# Patient Record
Sex: FEMALE | Race: WHITE | NOT HISPANIC OR LATINO | Employment: FULL TIME | URBAN - METROPOLITAN AREA
[De-identification: names, ages, dates, MRNs, and addresses within clinical notes are randomized per-mention and may not be internally consistent; named-entity substitution may affect disease eponyms.]

---

## 2023-04-12 ENCOUNTER — APPOINTMENT (OUTPATIENT)
Dept: LAB | Facility: LAB | Age: 33
End: 2023-04-12
Payer: COMMERCIAL

## 2023-04-12 LAB
ABO GROUP (TYPE) IN BLOOD: NORMAL
ANTIBODY SCREEN: NORMAL
ERYTHROCYTE DISTRIBUTION WIDTH (RATIO) BY AUTOMATED COUNT: 12.5 % (ref 11.5–14.5)
ERYTHROCYTE MEAN CORPUSCULAR HEMOGLOBIN CONCENTRATION (G/DL) BY AUTOMATED: 34.8 G/DL (ref 32–36)
ERYTHROCYTE MEAN CORPUSCULAR VOLUME (FL) BY AUTOMATED COUNT: 86 FL (ref 80–100)
ERYTHROCYTES (10*6/UL) IN BLOOD BY AUTOMATED COUNT: 4.06 X10E12/L (ref 4–5.2)
GLUCOSE, 1 HR SCREEN, PREG: 107 MG/DL
HEMATOCRIT (%) IN BLOOD BY AUTOMATED COUNT: 35.1 % (ref 36–46)
HEMOGLOBIN (G/DL) IN BLOOD: 12.2 G/DL (ref 12–16)
LEUKOCYTES (10*3/UL) IN BLOOD BY AUTOMATED COUNT: 9.9 X10E9/L (ref 4.4–11.3)
NRBC (PER 100 WBCS) BY AUTOMATED COUNT: 0 /100 WBC (ref 0–0)
PLATELETS (10*3/UL) IN BLOOD AUTOMATED COUNT: 230 X10E9/L (ref 150–450)
REFLEX ADDED, ANEMIA PANEL: ABNORMAL
RH FACTOR: NORMAL
SYPHILIS TOTAL AB: NONREACTIVE

## 2023-05-03 ENCOUNTER — HOSPITAL ENCOUNTER (OUTPATIENT)
Dept: DATA CONVERSION | Facility: HOSPITAL | Age: 33
End: 2023-05-03
Attending: STUDENT IN AN ORGANIZED HEALTH CARE EDUCATION/TRAINING PROGRAM
Payer: COMMERCIAL

## 2023-05-03 DIAGNOSIS — O99.353 DISEASES OF THE NERVOUS SYSTEM COMPLICATING PREGNANCY, THIRD TRIMESTER (HHS-HCC): ICD-10-CM

## 2023-05-03 DIAGNOSIS — R03.0 ELEVATED BLOOD-PRESSURE READING, WITHOUT DIAGNOSIS OF HYPERTENSION: ICD-10-CM

## 2023-05-03 DIAGNOSIS — G43.909 MIGRAINE, UNSPECIFIED, NOT INTRACTABLE, WITHOUT STATUS MIGRAINOSUS: ICD-10-CM

## 2023-05-03 DIAGNOSIS — F32.A DEPRESSION, UNSPECIFIED: ICD-10-CM

## 2023-05-03 DIAGNOSIS — O26.893 OTHER SPECIFIED PREGNANCY RELATED CONDITIONS, THIRD TRIMESTER (HHS-HCC): ICD-10-CM

## 2023-05-03 DIAGNOSIS — O99.343 OTHER MENTAL DISORDERS COMPLICATING PREGNANCY, THIRD TRIMESTER (HHS-HCC): ICD-10-CM

## 2023-05-03 DIAGNOSIS — Z79.899 OTHER LONG TERM (CURRENT) DRUG THERAPY: ICD-10-CM

## 2023-05-03 DIAGNOSIS — O13.3 GESTATIONAL (PREGNANCY-INDUCED) HYPERTENSION WITHOUT SIGNIFICANT PROTEINURIA, THIRD TRIMESTER (HHS-HCC): ICD-10-CM

## 2023-05-03 DIAGNOSIS — Z3A.29 29 WEEKS GESTATION OF PREGNANCY (HHS-HCC): ICD-10-CM

## 2023-05-03 DIAGNOSIS — F41.9 ANXIETY DISORDER, UNSPECIFIED: ICD-10-CM

## 2023-05-03 LAB
ALANINE AMINOTRANSFERASE (SGPT) (U/L) IN SER/PLAS: 12 U/L (ref 7–45)
ALBUMIN (G/DL) IN SER/PLAS: 3.4 G/DL (ref 3.4–5)
ALKALINE PHOSPHATASE (U/L) IN SER/PLAS: 116 U/L (ref 33–110)
ANION GAP IN SER/PLAS: 15 MMOL/L (ref 10–20)
ASPARTATE AMINOTRANSFERASE (SGOT) (U/L) IN SER/PLAS: 17 U/L (ref 9–39)
BILIRUBIN TOTAL (MG/DL) IN SER/PLAS: 0.4 MG/DL (ref 0–1.2)
CALCIUM (MG/DL) IN SER/PLAS: 9.1 MG/DL (ref 8.6–10.6)
CARBON DIOXIDE, TOTAL (MMOL/L) IN SER/PLAS: 22 MMOL/L (ref 21–32)
CHLORIDE (MMOL/L) IN SER/PLAS: 98 MMOL/L (ref 98–107)
CREATININE (MG/DL) IN SER/PLAS: 0.64 MG/DL (ref 0.5–1.05)
CREATININE (MG/DL) IN URINE: 52.6 MG/DL (ref 20–320)
ERYTHROCYTE DISTRIBUTION WIDTH (RATIO) BY AUTOMATED COUNT: 12.6 % (ref 11.5–14.5)
ERYTHROCYTE MEAN CORPUSCULAR HEMOGLOBIN CONCENTRATION (G/DL) BY AUTOMATED: 35.5 G/DL (ref 32–36)
ERYTHROCYTE MEAN CORPUSCULAR VOLUME (FL) BY AUTOMATED COUNT: 84 FL (ref 80–100)
ERYTHROCYTES (10*6/UL) IN BLOOD BY AUTOMATED COUNT: 4.18 X10E12/L (ref 4–5.2)
GFR FEMALE: >90 ML/MIN/1.73M2
GLUCOSE (MG/DL) IN SER/PLAS: 58 MG/DL (ref 74–99)
HEMATOCRIT (%) IN BLOOD BY AUTOMATED COUNT: 35.2 % (ref 36–46)
HEMOGLOBIN (G/DL) IN BLOOD: 12.5 G/DL (ref 12–16)
LACTATE DEHYDROGENASE (U/L) IN SER/PLAS BY LAC->PYR RXN: 172 U/L (ref 84–246)
LEUKOCYTES (10*3/UL) IN BLOOD BY AUTOMATED COUNT: 10.6 X10E9/L (ref 4.4–11.3)
NRBC (PER 100 WBCS) BY AUTOMATED COUNT: 0 /100 WBC (ref 0–0)
PLATELETS (10*3/UL) IN BLOOD AUTOMATED COUNT: 186 X10E9/L (ref 150–450)
POTASSIUM (MMOL/L) IN SER/PLAS: 3.9 MMOL/L (ref 3.5–5.3)
PROTEIN (MG/DL) IN URINE: 6 MG/DL (ref 5–24)
PROTEIN TOTAL: 6.3 G/DL (ref 6.4–8.2)
PROTEIN/CREATININE (MG/MG) IN URINE: 0.11 MG/MG CREAT (ref 0–0.17)
SODIUM (MMOL/L) IN SER/PLAS: 131 MMOL/L (ref 136–145)
URATE (MG/DL) IN SER/PLAS: 5.3 MG/DL (ref 2.3–6.7)
UREA NITROGEN (MG/DL) IN SER/PLAS: 10 MG/DL (ref 6–23)

## 2023-05-05 ENCOUNTER — HOSPITAL ENCOUNTER (OUTPATIENT)
Dept: DATA CONVERSION | Facility: HOSPITAL | Age: 33
End: 2023-05-05
Attending: OBSTETRICS & GYNECOLOGY
Payer: COMMERCIAL

## 2023-05-05 DIAGNOSIS — F32.A DEPRESSION, UNSPECIFIED: ICD-10-CM

## 2023-05-05 DIAGNOSIS — F41.9 ANXIETY DISORDER, UNSPECIFIED: ICD-10-CM

## 2023-05-05 DIAGNOSIS — O13.3 GESTATIONAL (PREGNANCY-INDUCED) HYPERTENSION WITHOUT SIGNIFICANT PROTEINURIA, THIRD TRIMESTER (HHS-HCC): ICD-10-CM

## 2023-05-05 DIAGNOSIS — O26.893 OTHER SPECIFIED PREGNANCY RELATED CONDITIONS, THIRD TRIMESTER (HHS-HCC): ICD-10-CM

## 2023-05-05 DIAGNOSIS — O99.343 OTHER MENTAL DISORDERS COMPLICATING PREGNANCY, THIRD TRIMESTER (HHS-HCC): ICD-10-CM

## 2023-05-05 DIAGNOSIS — R10.11 RIGHT UPPER QUADRANT PAIN: ICD-10-CM

## 2023-05-05 DIAGNOSIS — R03.0 ELEVATED BLOOD-PRESSURE READING, WITHOUT DIAGNOSIS OF HYPERTENSION: ICD-10-CM

## 2023-05-05 DIAGNOSIS — Z3A.30 30 WEEKS GESTATION OF PREGNANCY (HHS-HCC): ICD-10-CM

## 2023-05-05 LAB
ALANINE AMINOTRANSFERASE (SGPT) (U/L) IN SER/PLAS: 13 U/L (ref 7–45)
ALBUMIN (G/DL) IN SER/PLAS: 3.5 G/DL (ref 3.4–5)
ALKALINE PHOSPHATASE (U/L) IN SER/PLAS: 120 U/L (ref 33–110)
ANION GAP IN SER/PLAS: 15 MMOL/L (ref 10–20)
ASPARTATE AMINOTRANSFERASE (SGOT) (U/L) IN SER/PLAS: 21 U/L (ref 9–39)
BILIRUBIN TOTAL (MG/DL) IN SER/PLAS: 0.4 MG/DL (ref 0–1.2)
CALCIUM (MG/DL) IN SER/PLAS: 9.1 MG/DL (ref 8.6–10.6)
CARBON DIOXIDE, TOTAL (MMOL/L) IN SER/PLAS: 21 MMOL/L (ref 21–32)
CHLORIDE (MMOL/L) IN SER/PLAS: 99 MMOL/L (ref 98–107)
CREATININE (MG/DL) IN SER/PLAS: 0.65 MG/DL (ref 0.5–1.05)
CREATININE (MG/DL) IN URINE: 43.8 MG/DL (ref 20–320)
ERYTHROCYTE DISTRIBUTION WIDTH (RATIO) BY AUTOMATED COUNT: 12.8 % (ref 11.5–14.5)
ERYTHROCYTE MEAN CORPUSCULAR HEMOGLOBIN CONCENTRATION (G/DL) BY AUTOMATED: 34.3 G/DL (ref 32–36)
ERYTHROCYTE MEAN CORPUSCULAR VOLUME (FL) BY AUTOMATED COUNT: 86 FL (ref 80–100)
ERYTHROCYTES (10*6/UL) IN BLOOD BY AUTOMATED COUNT: 4.27 X10E12/L (ref 4–5.2)
GFR FEMALE: >90 ML/MIN/1.73M2
GLUCOSE (MG/DL) IN SER/PLAS: 50 MG/DL (ref 74–99)
HEMATOCRIT (%) IN BLOOD BY AUTOMATED COUNT: 36.7 % (ref 36–46)
HEMOGLOBIN (G/DL) IN BLOOD: 12.6 G/DL (ref 12–16)
LACTATE DEHYDROGENASE (U/L) IN SER/PLAS BY LAC->PYR RXN: 267 U/L (ref 84–246)
LEUKOCYTES (10*3/UL) IN BLOOD BY AUTOMATED COUNT: 11 X10E9/L (ref 4.4–11.3)
NRBC (PER 100 WBCS) BY AUTOMATED COUNT: 0 /100 WBC (ref 0–0)
PLATELETS (10*3/UL) IN BLOOD AUTOMATED COUNT: 189 X10E9/L (ref 150–450)
POTASSIUM (MMOL/L) IN SER/PLAS: 4.2 MMOL/L (ref 3.5–5.3)
PROTEIN (MG/DL) IN URINE: 5 MG/DL (ref 5–24)
PROTEIN TOTAL: 6.5 G/DL (ref 6.4–8.2)
PROTEIN/CREATININE (MG/MG) IN URINE: 0.11 MG/MG CREAT (ref 0–0.17)
SODIUM (MMOL/L) IN SER/PLAS: 131 MMOL/L (ref 136–145)
UREA NITROGEN (MG/DL) IN SER/PLAS: 10 MG/DL (ref 6–23)

## 2023-09-07 VITALS
WEIGHT: 161.82 LBS | BODY MASS INDEX: 29.78 KG/M2 | HEIGHT: 62 IN | WEIGHT: 161.6 LBS | HEIGHT: 62 IN | BODY MASS INDEX: 29.74 KG/M2

## 2023-09-14 NOTE — PROGRESS NOTES
Current Stage:   Stage: Triage     OB Dating:   EDC/EGA:  ·  EGA 29.5     Subjective Data:   Antepartum:  Vaginal Bleeding: No   Contractions/Abdominal Pain: No   Discharge/Loss of Fluid: No   Fetal Movement: Good   Fevers/Chills: No   Preeclampsia Symptoms: No   Antepartum:    Meaghan is a 33yo G1 at 29.5wga d/b 10.5wk US who presents w/ elevated BPs at home.    HPI: Patient says she g a migraine last friday and has been checking her blood pressure at home since. It has ranged from 135-155 systolic over  diastolic. She denies HA today. She denies vision changes or RUQ pain. Endorsing good FM, no VB, LOF  or ctx.     Pregnancy notable for:  - x1 MRBP in office at 27 wks   - depression, zoloft 50mg  - migraines, previously on cymbalta, stopped taking in pregnancy  - Rh neg, s/p rhogam on 4/20     OBHx: G1  GynHx: no STIs,   PMH: migraines   PSH: ACL repair at 17yo    All: nkda   Meds: PNV, zoloft   Social: denies etoh, drugs, tobacco  FamHx:         Objective Information:    Objective Information:      T   P  R  BP   MAP  SpO2   Value     74  16  140/93   112  98%  Date/Time   5/3 17:42 5/3 16:07 5/3 17:37  5/3 17:37 5/3 17:42  Range     (67 - 77 )  (16 - 16 )  (136 - 144 )/ (90 - 96 )  (108 - 116 )  (97% - 99% )      Pain reported at 5/3 16:05: 0 = None      Physical Exam:   Constitutional: alert, oriented   Obstetric: FHT: 135, mod variability, +accels, -decels   TOCO: quiet   Respiratory/Thorax: normal respiratory effort, on  room air   Gastrointestinal: +BS, +flatus   Musculoskeletal: FOURNIER   Extremities: no erythema, edema, or tenderness to  palpation of b/l calves   Neurological: no deficits   Psychological: appropriate affect   Skin: no rashes or lesions     Recent Lab Results:    Results:    CBC: 5/3/2023 16:59              \     Hgb     /                              \     12.5       /  WBC  ----------------  Plt               10.6       ----------------    186              /     Hct     \                               /     35.2 L    \            RBC: 4.18     MCV: 84          Testing:   NST Interpretation - Baby A:  ·  Baseline    ·  Variability moderate (amplitude range 6 to 25 bpm)   ·  Interpretation Appropriate for EGA (2 10x10 accels)   ·  Accelerations +   ·  Decelerations -     Assessment and Plan:   Assessment:    Meaghan is a 33yo G1 at 29.5wga d/b 10.5wk US who presents w/ elevated BPs at home.    gHTN  - new diagnosis made today by elevated BPs >4 hrs apart (first BP at 27wks and second here in triage)  - BPs mild range in triage  - HELLP labs neg  - PCR 0.11  - denies HA, N/V, RUQ pain, vision changes  - note made in outpatient chart of dx  - has BP cuff for home, BID BP checks   - discussed s/sx of PEC and return precautions    IUP at 29.5  - NST appropriate for gestational age   - Good fetal movement  - Continue routine prenatal care, next appt:  w/ CNM    Maternal Well-being  - Vital signs stable and WNL  - Emotional support and reassurance provided  - All questions and concerns addressed     D/w Dr. Bryant.  MIC Longo     Plan of Care Reviewed With:  Plan of Care Reviewed With: patient     Attestation:   Note Completion:  I am a:  Advanced Practice Provider   Attending Only - Shared Visit with Advanced Practice Provider This is a shared visit.  I have reviewed the Advanced Practice Provider?s encounter note, approve the Advanced Practice Provider?s documentation,  and provide the following additional information from my personal encounter.    Comments/ Additional Findings    agree          Electronic Signatures:  Lan Bryant)  (Signed 03-May-2023 21:43)   Authored: Note Completion   Co-Signer: Current Stage, OB Dating, Subjective Data, Objective Data,  Testing, Assessment and Plan, Note Completion  Madison Vega (PAC)  (Signed 03-May-2023 18:44)   Authored: Current Stage, OB Dating, Subjective Data,  Objective Data,  Testing,  Assessment and Plan, Note Completion      Last Updated: 03-May-2023 21:43 by Lan Bryant)

## 2023-09-14 NOTE — PROGRESS NOTES
Current Stage:   Stage: Triage     OB Dating:   EDC/EGA:  ·  Final MARK 14-Jul-2023   ·  EGA 30     Subjective Data:   Antepartum:  Vaginal Bleeding: No   Contractions/Abdominal Pain: No   Discharge/Loss of Fluid: No   Fetal Movement: Good   Fevers/Chills: No   Preeclampsia Symptoms: No   Antepartum:    Meaghan is a 31yo G1 at 30.0wks by 10.5wk US who presents to triage from the office for severe range BPs.  She states she had one severe range followed by a mild range BP.  She denies  HA, vision changes, CP, SOB, or RUQ pain.  She denies VB, LOF, ctx, and endorses good fetal movement.  Of note, she was diagnosed with gHTN on 5/3 for mild range BPs >4hrs apart.    Pregnancy notable for:  - gHTN, dx b/o mild range BPs >4hrs apart  - Anxiety/depression, no meds, mood stable  - RH neg, s/p rhogam        Objective Information:    Objective Information:      T   P  R  BP   MAP  SpO2   Value  36.7  77  18  138/90   110  97%  Date/Time 5/5 16:35 5/5 19:36 5/5 16:35 5/5 19:36  5/5 19:36 5/5 16:35  Range  (36.7C - 36.7C )  (65 - 78 )  (18 - 18 )  (136 - 154 )/ (85 - 92 )  (108 - 113 )  (97% - 97% )      Pain reported at 5/5 16:35: per patient, no pain but RUQ pressure- Ponter notified      Physical Exam:   Constitutional: Alert, conversational, well-appearing   Obstetric: FHT: 130, mod variability, +accels, -decels   Violet: q5-7 on TOCO, pt denies ctx   Eyes: Sclera white, EOM intact, no discharge or erythema   ENMT: No hearing deficit, no goiter present   Head/Neck: Normocephalic, atraumatic, full range  of motion intact   Respiratory/Thorax: No increased work of breathing,  no cough present, rate normal   Cardiovascular: No edema present   Gastrointestinal: Gravid   Genitourinary: No suprapubic tenderness   Musculoskeletal: Strength +5 in all extremities bilaterally   Extremities: No calf tenderness, redness or warmth   Neurological: A/O x3, conversational   Breast: No redness or warmth   Psychological: Behavior appropriate,  interactive   Skin: No rashes or lesions     Recent Lab Results:    Results:    CBC: 2023 17:06              \     Hgb     /                              \     12.6       /  WBC  ----------------  Plt               11.0       ----------------    189              /     Hct     \                              /     36.7       \            RBC: 4.27     MCV: 86           CMP: 2023 17:06  NA+        Cl-     BUN  /                         131 L   99    10  /  --------------------------------  Glucose                ---------------------------  50 LL    K+     HCO3-   Creat \                         4.2    21    0.65  \           \  T Bili  /                    \  0.4  /  AST  x ---- x ALT        21 x ---- x 13         /  Alk P   \               /  120 H \  Calcium : 9.1     Anion Gap : 15     Albumin : 3.5     T Protein : 6.5            Testing:   NST Interpretation - Baby A:  ·  Baseline    ·  Variability moderate (amplitude range 6 to 25 bpm)   ·  Interpretation Appropriate for EGA (2 10x10 accels)   ·  Accelerations Present   ·  Decelerations Absent     Assessment and Plan:        Additional Dx:   30 weeks gestation of pregnancy: Entered Date: 05-May-2023  19:52       Surg History:   NST (non-stress test) reactive: Entered Date: 03-May-2023  18:44   Gestational hypertension: Entered Date: 03-May-2023 18:44    Assessment:    Meaghan is a 33yo G1 at 30.0wks by 10.5wk US who presents to triage from the office for severe range BPs.    gHTN    - Dx by 2 mild range pressures >4hrs apart  - Severe range BP x1 in office, mild range in triage  - Asymptomatic  - HELLP labs negative  - P:C: 0.11  - BP cuff for home  - Reviewed signs elevated BP, appropriate BP parameters and how to monitor BP at home     IUP at 30.0wks  - NST appropriate for gestational age   - Good fetal movement  - Continue routine prenatal care  - Precautions to return discussed    Maternal Well-being  - Vital signs stable  - All  questions and concerns addressed    Plan and tracing discussed with Dr. Castillo who reviewed tracing and agrees with d/c home.     Shannen Kunz, MSN, APRN, FNP-C           Attestation:   Note Completion:  I am a:  Advanced Practice Provider   Attending Only - Shared Visit with Advanced Practice Provider This is a shared visit.  I have reviewed the Advanced Practice Provider?s encounter note, approve the Advanced Practice Provider?s documentation,  and provide the following additional information from my personal encounter.    Comments/ Additional Findings    .          Electronic Signatures:  Shannen Kunz (APRN-CNP)  (Signed 05-May-2023 19:54)   Authored: Current Stage, OB Dating, Subjective Data,  Objective Data,  Testing, Assessment and Plan, Note Completion  Jeff Castillo)  (Signed 05-May-2023 21:36)   Authored: Note Completion   Co-Signer: Current Stage, OB Dating, Subjective Data, Objective Data,  Testing, Assessment and Plan, Note Completion      Last Updated: 05-May-2023 21:36 by Jeff Castillo)

## 2023-10-25 ENCOUNTER — TELEPHONE (OUTPATIENT)
Dept: OBSTETRICS AND GYNECOLOGY | Facility: CLINIC | Age: 33
End: 2023-10-25
Payer: COMMERCIAL

## 2023-11-12 PROBLEM — K58.9 IRRITABLE BOWEL: Status: ACTIVE | Noted: 2023-11-12

## 2023-11-12 PROBLEM — G43.109 MIGRAINE WITH AURA AND WITHOUT STATUS MIGRAINOSUS, NOT INTRACTABLE: Status: ACTIVE | Noted: 2023-11-12

## 2023-11-12 PROBLEM — F32.A DEPRESSION: Status: ACTIVE | Noted: 2023-11-12

## 2023-11-12 PROBLEM — O14.10 PREECLAMPSIA, SEVERE (HHS-HCC): Status: ACTIVE | Noted: 2023-11-12

## 2023-11-12 PROBLEM — R53.83 FATIGUE: Status: ACTIVE | Noted: 2023-11-12

## 2023-11-12 PROBLEM — F41.9 ANXIETY: Status: ACTIVE | Noted: 2023-11-12

## 2023-11-12 PROBLEM — E55.9 VITAMIN D DEFICIENCY: Status: ACTIVE | Noted: 2023-11-12

## 2023-11-12 RX ORDER — IBUPROFEN 600 MG/1
TABLET ORAL
COMMUNITY
Start: 2023-05-17 | End: 2023-11-14 | Stop reason: WASHOUT

## 2023-11-12 RX ORDER — SERTRALINE HYDROCHLORIDE 50 MG/1
50 TABLET, FILM COATED ORAL DAILY
COMMUNITY
End: 2023-11-14

## 2023-11-12 RX ORDER — DULOXETIN HYDROCHLORIDE 30 MG/1
1 CAPSULE, DELAYED RELEASE ORAL 3 TIMES DAILY
COMMUNITY
Start: 2019-09-03 | End: 2023-11-14 | Stop reason: WASHOUT

## 2023-11-14 ENCOUNTER — INITIAL PRENATAL (OUTPATIENT)
Dept: OBSTETRICS AND GYNECOLOGY | Facility: HOSPITAL | Age: 33
End: 2023-11-14
Payer: COMMERCIAL

## 2023-11-14 VITALS — WEIGHT: 159 LBS | DIASTOLIC BLOOD PRESSURE: 82 MMHG | SYSTOLIC BLOOD PRESSURE: 126 MMHG | BODY MASS INDEX: 29.08 KG/M2

## 2023-11-14 DIAGNOSIS — O09.91 SUPERVISION OF HIGH RISK PREGNANCY IN FIRST TRIMESTER (HHS-HCC): Primary | ICD-10-CM

## 2023-11-14 DIAGNOSIS — O99.341 DEPRESSION DURING PREGNANCY IN FIRST TRIMESTER (HHS-HCC): ICD-10-CM

## 2023-11-14 DIAGNOSIS — F32.A DEPRESSION DURING PREGNANCY IN FIRST TRIMESTER (HHS-HCC): ICD-10-CM

## 2023-11-14 DIAGNOSIS — F32.A DEPRESSION, UNSPECIFIED: ICD-10-CM

## 2023-11-14 PROBLEM — Z87.59 HISTORY OF SEVERE PRE-ECLAMPSIA: Status: ACTIVE | Noted: 2023-11-12

## 2023-11-14 LAB
CREAT UR-MCNC: 14.8 MG/DL (ref 20–320)
PROT UR-ACNC: <4 MG/DL (ref 5–24)
PROT/CREAT UR: ABNORMAL MG/G{CREAT}

## 2023-11-14 PROCEDURE — 87800 DETECT AGNT MULT DNA DIREC: CPT | Performed by: OBSTETRICS & GYNECOLOGY

## 2023-11-14 PROCEDURE — 82570 ASSAY OF URINE CREATININE: CPT | Performed by: OBSTETRICS & GYNECOLOGY

## 2023-11-14 PROCEDURE — 0500F INITIAL PRENATAL CARE VISIT: CPT | Performed by: OBSTETRICS & GYNECOLOGY

## 2023-11-14 PROCEDURE — 87086 URINE CULTURE/COLONY COUNT: CPT | Performed by: OBSTETRICS & GYNECOLOGY

## 2023-11-14 RX ORDER — SERTRALINE HYDROCHLORIDE 50 MG/1
50 TABLET, FILM COATED ORAL DAILY
Qty: 90 TABLET | Refills: 3 | Status: SHIPPED | OUTPATIENT
Start: 2023-11-14 | End: 2024-02-06 | Stop reason: SDUPTHER

## 2023-11-14 SDOH — ECONOMIC STABILITY: FOOD INSECURITY: WITHIN THE PAST 12 MONTHS, YOU WORRIED THAT YOUR FOOD WOULD RUN OUT BEFORE YOU GOT MONEY TO BUY MORE.: NEVER TRUE

## 2023-11-14 SDOH — ECONOMIC STABILITY: FOOD INSECURITY: WITHIN THE PAST 12 MONTHS, THE FOOD YOU BOUGHT JUST DIDN'T LAST AND YOU DIDN'T HAVE MONEY TO GET MORE.: NEVER TRUE

## 2023-11-14 ASSESSMENT — EDINBURGH POSTNATAL DEPRESSION SCALE (EPDS)
THINGS HAVE BEEN GETTING ON TOP OF ME: NO, I HAVE BEEN COPING AS WELL AS EVER
I HAVE BEEN ABLE TO LAUGH AND SEE THE FUNNY SIDE OF THINGS: AS MUCH AS I ALWAYS COULD
I HAVE BEEN SO UNHAPPY THAT I HAVE HAD DIFFICULTY SLEEPING: NOT VERY OFTEN
I HAVE LOOKED FORWARD WITH ENJOYMENT TO THINGS: RATHER LESS THAN I USED TO
TOTAL SCORE: 7
I HAVE BLAMED MYSELF UNNECESSARILY WHEN THINGS WENT WRONG: NO, NEVER
THE THOUGHT OF HARMING MYSELF HAS OCCURRED TO ME: NEVER
I HAVE BEEN SO UNHAPPY THAT I HAVE BEEN CRYING: NO, NEVER
I HAVE BEEN ANXIOUS OR WORRIED FOR NO GOOD REASON: YES, SOMETIMES
I HAVE FELT SAD OR MISERABLE: NOT VERY OFTEN
I HAVE FELT SCARED OR PANICKY FOR NO GOOD REASON: YES, SOMETIMES

## 2023-11-14 ASSESSMENT — ENCOUNTER SYMPTOMS
DIARRHEA: 0
DEPRESSION: 0
CONSTITUTIONAL NEGATIVE: 0
OCCASIONAL FEELINGS OF UNSTEADINESS: 0
DIZZINESS: 0
MUSCULOSKELETAL NEGATIVE: 0
CHILLS: 0
RESPIRATORY NEGATIVE: 0
FEVER: 0
CONSTIPATION: 0
NEUROLOGICAL NEGATIVE: 0
PALPITATIONS: 0
EYES NEGATIVE: 0
DYSURIA: 0
HEMATOLOGIC/LYMPHATIC NEGATIVE: 0
GASTROINTESTINAL NEGATIVE: 0
VOMITING: 0
ALLERGIC/IMMUNOLOGIC NEGATIVE: 0
HEMATURIA: 0
COUGH: 0
NAUSEA: 0
FREQUENCY: 0
ABDOMINAL PAIN: 0
CARDIOVASCULAR NEGATIVE: 0
ENDOCRINE NEGATIVE: 0
PSYCHIATRIC NEGATIVE: 0
WEAKNESS: 0
SHORTNESS OF BREATH: 0
HEADACHES: 0
LOSS OF SENSATION IN FEET: 0

## 2023-11-14 NOTE — PROGRESS NOTES
Initial Prenatal Visit    JUDITH Gaona is a 33 y.o.  at 8w5d with an estimated date of delivery of 2024, by Last Menstrual Period who presents for an initial prenatal visit.    Doing well. Unexpected pregnancy. Would like to continue. Currently asymptomatic.     OB/GYN History  OB History    Para Term  AB Living   2 1   1   1   SAB IAB Ectopic Multiple Live Births           1      # Outcome Date GA Lbr Matias/2nd Weight Sex Delivery Anes PTL Lv   2 Current            1  23 31w1d    Vag-Spont   ALEXEI      Patient's last menstrual period was 2023.    Prior pregnancy complications: sPEC, FGR    The following portions of the chart were reviewed this encounter and updated as appropriate:    Tobacco  Allergies  Meds  Problems  Med Hx  Surg Hx  Fam Hx         Review of Systems  Review of Systems   Constitutional:  Negative for chills and fever.   Eyes:  Negative for visual disturbance.   Respiratory:  Negative for cough and shortness of breath.    Cardiovascular:  Negative for chest pain and palpitations.   Gastrointestinal:  Negative for abdominal pain, constipation, diarrhea, nausea and vomiting.   Genitourinary:  Negative for dyspareunia, dysuria, frequency, hematuria, urgency, vaginal bleeding and vaginal discharge.   Neurological:  Negative for dizziness, weakness and headaches.        OBJECTIVE  Vitals:    23 1536   BP: 126/82   Weight: 72.1 kg (159 lb)          Expected Total Weight Gain: 7 kg (15 lb)-11.5 kg (25 lb)  Pregravid BMI: 26.88    Physical Exam  See prenatal physical exam tab    ASSESSMENT & PLAN    History of severe pre-eclampsia  - bASA Rx  - Baseline CMP and UPC today    Supervision of high risk pregnancy in first trimester  - Prenatal labs  - US today confirms MARK  - cffDNA ordered    Depression during pregnancy in first trimester  - Refill Zoloft Rx      Routine care  -Ultrasound performed measuring 8w1d + FCA, confirms MARK  -Initial  prenatal labs today, STI screening, Pap UTD  -OB education provided.  -Recommended vaccines (COVID-19, flu).  -Clinical risk assessment for preeclampsia: high, low-dose aspirin at 12 weeks    Genetic counseling  -Patient was counseled regarding risks and benefits of serum screening for genetic disorders. After discussion, patient desires  -Patient was also counseled about options for Down's syndrome and other aneuploidy screening. After discussion, patient desires cffDNA, draw after 10 weeks  -Advised anatomy ultrasound at 18-20 weeks.       Delivery Plans  Planned delivery method: Vaginal  Planned delivery location: [facility]  Planned anesthesia: Epidural  Acceptable blood products: All     Post-Delivery Plans  Feeding intentions: Breast Milk  Planned birth control: Pill      Follow up in 4 weeks for return OB visit.    Edna East MD  Obstetrics & Gynecology  11/14/23

## 2023-11-15 ENCOUNTER — LAB (OUTPATIENT)
Dept: LAB | Facility: LAB | Age: 33
End: 2023-11-15
Payer: COMMERCIAL

## 2023-11-15 DIAGNOSIS — O09.91 SUPERVISION OF HIGH RISK PREGNANCY IN FIRST TRIMESTER (HHS-HCC): ICD-10-CM

## 2023-11-15 LAB
ABO GROUP (TYPE) IN BLOOD: NORMAL
ALBUMIN SERPL BCP-MCNC: 4.4 G/DL (ref 3.4–5)
ALP SERPL-CCNC: 68 U/L (ref 33–110)
ALT SERPL W P-5'-P-CCNC: 12 U/L (ref 7–45)
ANION GAP SERPL CALC-SCNC: 15 MMOL/L (ref 10–20)
ANTIBODY SCREEN: NORMAL
AST SERPL W P-5'-P-CCNC: 15 U/L (ref 9–39)
BACTERIA UR CULT: NO GROWTH
BILIRUB SERPL-MCNC: 0.5 MG/DL (ref 0–1.2)
BUN SERPL-MCNC: 9 MG/DL (ref 6–23)
C TRACH RRNA SPEC QL NAA+PROBE: NEGATIVE
CALCIUM SERPL-MCNC: 9.3 MG/DL (ref 8.6–10.6)
CHLORIDE SERPL-SCNC: 101 MMOL/L (ref 98–107)
CO2 SERPL-SCNC: 24 MMOL/L (ref 21–32)
CREAT SERPL-MCNC: 0.64 MG/DL (ref 0.5–1.05)
ERYTHROCYTE [DISTWIDTH] IN BLOOD BY AUTOMATED COUNT: 13.3 % (ref 11.5–14.5)
GFR SERPL CREATININE-BSD FRML MDRD: >90 ML/MIN/1.73M*2
GLUCOSE SERPL-MCNC: 62 MG/DL (ref 74–99)
HBV SURFACE AG SERPL QL IA: NONREACTIVE
HCT VFR BLD AUTO: 37.8 % (ref 36–46)
HCV AB SER QL: NONREACTIVE
HGB BLD-MCNC: 12.8 G/DL (ref 12–16)
HIV 1+2 AB+HIV1 P24 AG SERPL QL IA: NONREACTIVE
MCH RBC QN AUTO: 28.9 PG (ref 26–34)
MCHC RBC AUTO-ENTMCNC: 33.9 G/DL (ref 32–36)
MCV RBC AUTO: 85 FL (ref 80–100)
N GONORRHOEA DNA SPEC QL PROBE+SIG AMP: NEGATIVE
NRBC BLD-RTO: 0 /100 WBCS (ref 0–0)
PLATELET # BLD AUTO: 253 X10*3/UL (ref 150–450)
POTASSIUM SERPL-SCNC: 3.8 MMOL/L (ref 3.5–5.3)
PROT SERPL-MCNC: 7.1 G/DL (ref 6.4–8.2)
RBC # BLD AUTO: 4.43 X10*6/UL (ref 4–5.2)
REFLEX ADDED, ANEMIA PANEL: NORMAL
RH FACTOR (ANTIGEN D): NORMAL
RUBV IGG SERPL IA-ACNC: 1 IA
RUBV IGG SERPL QL IA: POSITIVE
SODIUM SERPL-SCNC: 136 MMOL/L (ref 136–145)
T PALLIDUM AB SER QL: NONREACTIVE
VARICELLA ZOSTER IGG INDEX: 2 IA
VZV IGG SER QL IA: POSITIVE
WBC # BLD AUTO: 8.7 X10*3/UL (ref 4.4–11.3)

## 2023-11-15 PROCEDURE — 86901 BLOOD TYPING SEROLOGIC RH(D): CPT

## 2023-11-15 PROCEDURE — 85027 COMPLETE CBC AUTOMATED: CPT

## 2023-11-15 PROCEDURE — 87389 HIV-1 AG W/HIV-1&-2 AB AG IA: CPT

## 2023-11-15 PROCEDURE — 86900 BLOOD TYPING SEROLOGIC ABO: CPT

## 2023-11-15 PROCEDURE — 86787 VARICELLA-ZOSTER ANTIBODY: CPT

## 2023-11-15 PROCEDURE — 86850 RBC ANTIBODY SCREEN: CPT

## 2023-11-15 PROCEDURE — 86803 HEPATITIS C AB TEST: CPT

## 2023-11-15 PROCEDURE — 86317 IMMUNOASSAY INFECTIOUS AGENT: CPT

## 2023-11-15 PROCEDURE — 80053 COMPREHEN METABOLIC PANEL: CPT

## 2023-11-15 PROCEDURE — 86780 TREPONEMA PALLIDUM: CPT

## 2023-11-15 PROCEDURE — 36415 COLL VENOUS BLD VENIPUNCTURE: CPT

## 2023-11-15 PROCEDURE — 87340 HEPATITIS B SURFACE AG IA: CPT

## 2023-12-06 ENCOUNTER — ANCILLARY PROCEDURE (OUTPATIENT)
Dept: RADIOLOGY | Facility: CLINIC | Age: 33
End: 2023-12-06
Payer: COMMERCIAL

## 2023-12-06 ENCOUNTER — TELEPHONE (OUTPATIENT)
Dept: OBSTETRICS AND GYNECOLOGY | Facility: CLINIC | Age: 33
End: 2023-12-06

## 2023-12-06 ENCOUNTER — INITIAL PRENATAL (OUTPATIENT)
Dept: MATERNAL FETAL MEDICINE | Facility: CLINIC | Age: 33
End: 2023-12-06
Payer: COMMERCIAL

## 2023-12-06 VITALS
DIASTOLIC BLOOD PRESSURE: 83 MMHG | WEIGHT: 160.31 LBS | HEART RATE: 76 BPM | BODY MASS INDEX: 29.32 KG/M2 | SYSTOLIC BLOOD PRESSURE: 129 MMHG

## 2023-12-06 DIAGNOSIS — O09.91 SUPERVISION OF HIGH RISK PREGNANCY IN FIRST TRIMESTER (HHS-HCC): ICD-10-CM

## 2023-12-06 DIAGNOSIS — O09.299 H/O PRE-ECLAMPSIA IN PRIOR PREGNANCY, CURRENTLY PREGNANT (HHS-HCC): Primary | ICD-10-CM

## 2023-12-06 DIAGNOSIS — Z3A.11 11 WEEKS GESTATION OF PREGNANCY (HHS-HCC): ICD-10-CM

## 2023-12-06 DIAGNOSIS — F32.A DEPRESSION DURING PREGNANCY IN FIRST TRIMESTER (HHS-HCC): ICD-10-CM

## 2023-12-06 DIAGNOSIS — O99.341 DEPRESSION DURING PREGNANCY IN FIRST TRIMESTER (HHS-HCC): ICD-10-CM

## 2023-12-06 PROBLEM — R53.83 FATIGUE: Status: RESOLVED | Noted: 2023-11-12 | Resolved: 2023-12-06

## 2023-12-06 PROBLEM — E55.9 VITAMIN D DEFICIENCY: Status: RESOLVED | Noted: 2023-11-12 | Resolved: 2023-12-06

## 2023-12-06 PROBLEM — G43.109 MIGRAINE WITH AURA AND WITHOUT STATUS MIGRAINOSUS, NOT INTRACTABLE: Status: RESOLVED | Noted: 2023-11-12 | Resolved: 2023-12-06

## 2023-12-06 PROBLEM — F41.9 ANXIETY: Status: RESOLVED | Noted: 2023-11-12 | Resolved: 2023-12-06

## 2023-12-06 PROBLEM — K58.9 IRRITABLE BOWEL: Status: RESOLVED | Noted: 2023-11-12 | Resolved: 2023-12-06

## 2023-12-06 LAB — GLUCOSE BLD MANUAL STRIP-MCNC: 76 MG/DL (ref 74–99)

## 2023-12-06 PROCEDURE — 99204 OFFICE O/P NEW MOD 45 MIN: CPT | Performed by: OBSTETRICS & GYNECOLOGY

## 2023-12-06 PROCEDURE — 76813 OB US NUCHAL MEAS 1 GEST: CPT

## 2023-12-06 PROCEDURE — 99214 OFFICE O/P EST MOD 30 MIN: CPT | Mod: GC | Performed by: OBSTETRICS & GYNECOLOGY

## 2023-12-06 PROCEDURE — 76813 OB US NUCHAL MEAS 1 GEST: CPT | Performed by: OBSTETRICS & GYNECOLOGY

## 2023-12-06 PROCEDURE — 82947 ASSAY GLUCOSE BLOOD QUANT: CPT | Performed by: OBSTETRICS & GYNECOLOGY

## 2023-12-06 ASSESSMENT — PAIN SCALES - GENERAL: PAINLEVEL_OUTOF10: 0 - NO PAIN

## 2023-12-06 ASSESSMENT — PAIN - FUNCTIONAL ASSESSMENT: PAIN_FUNCTIONAL_ASSESSMENT: 0-10

## 2023-12-06 NOTE — ASSESSMENT & PLAN NOTE
The patient was counseled on her previous history of preeclampsia with severe features and growth restriction with her prior pregnancy.  -- patient was counseled on the increase risk of possible preeclampsia and growth restriction in subsequent pregnancies  -- recommend 162 mg ASA daily for risk reduction   -- blood pressure cuff ordered to monitor blood pressures at home for surveillance.  -- Recommend APLS testing, given previous delivery prior to 34 weeks, ordered  -- recommend growth ultrasounds starting at 24 weeks Q 4 weeks.

## 2023-12-06 NOTE — ASSESSMENT & PLAN NOTE
Prenatal labs reviewed: UTD and wnl  Dated by LMP c/w 11 wk sono  Genetics: NIPS pending  BCM: POP

## 2023-12-06 NOTE — PROGRESS NOTES
[facility] Fellow Note: OB High-Risk Clinic  Initial Visit  2023    Ms. Meaghan Gaona is a 33 y.o.  at 11w6d by LMP c/w 11 wk sono who presents for initial high-risk OB clinic visit.  She was referred by Dr. East.    Today patient is doing well. Denies OB complaints. She denies vaginal bleeding, abnormal vaginal discharge, pelvic pain and/or cramping, loss of fluid, fevers, chills, dysuria, or other urinary issues.     Patient was in her normal state of health until about 31 weeks of her previous pregnancy. Patient was sent in for monitoring due to elevated blood pressures. Patient was found to have non reactive fetal heart tracing. While inpatient, subsequently found to have severe preeclampsia and fetal growth restriction. Patient was monitored and up titrated on medications until fetal heart tracing became concerning and delivery was warranted.    ROS  See HPI. 14 pt ROS otherwise negative.    No past medical history on file.    Past Surgical History:   Procedure Laterality Date    MR CHEST ANGIO WO IV CONTRAST  2019    MR CHEST ANGIO WO IV CONTRAST 2019 [facility]   ACL repair    OB History    Para Term  AB Living   2 1   1   1   SAB IAB Ectopic Multiple Live Births           1      # Outcome Date GA Lbr Matias/2nd Weight Sex Delivery Anes PTL Lv   2 Current            1  23 31w1d  1474 g  Vag-Spont   ALEXEI       Social History     Socioeconomic History    Marital status: Single     Spouse name: Not on file    Number of children: Not on file    Years of education: Not on file    Highest education level: Not on file   Occupational History    Not on file   Tobacco Use    Smoking status: Never    Smokeless tobacco: Never   Substance and Sexual Activity    Alcohol use: Never    Drug use: Never    Sexual activity: Yes     Partners: Male   Other Topics Concern    Not on file   Social History Narrative    Not on file     Social Determinants of Health     Financial  Resource Strain: Not on file   Food Insecurity: No Food Insecurity (11/14/2023)    Hunger Vital Sign     Worried About Running Out of Food in the Last Year: Never true     Ran Out of Food in the Last Year: Never true   Transportation Needs: Not on file   Physical Activity: Not on file   Stress: Not on file   Social Connections: Not on file   Intimate Partner Violence: Not on file       No family history on file.    Physical Exam  Vitals:    12/06/23 1511   BP: 129/83   Pulse:      General: NAD  CV: RRR  Respiratory: No increased work of breathing noted  Abdomen: soft, gravid, non tender  Pelvic exam: deferred    Doppler FHT obtained on sono 12/06/23    Assessment and Plan:    H/O pre-eclampsia in prior pregnancy, currently pregnant  The patient was counseled on her previous history of preeclampsia with severe features and growth restriction with her prior pregnancy.  -- patient was counseled on the increase risk of possible preeclampsia and growth restriction in subsequent pregnancies  -- recommend 162 mg ASA daily for risk reduction   -- blood pressure cuff ordered to monitor blood pressures at home for surveillance.  -- Recommend APLS testing, given previous delivery prior to 34 weeks, ordered  -- recommend growth ultrasounds starting at 24 weeks Q 4 weeks.    11 weeks gestation of pregnancy  Prenatal labs reviewed: UTD and wnl  Dated by LMP c/w 11 wk sono  Genetics: NIPS pending  BCM: POP    Depression during pregnancy in first trimester  reviewed the implications of this illness during pregnancy and offered counseling and the ability for consultation with psychiatrist.    --the pt does not have a psychiatrist and does not want counseling    -- the patient states that she does not need to meet with anyone at the current time.     --the patient denies insomnia, guilt, loss of energy, anorrhexia, or suicidal/homicidal ideation. She states that she occassionally feels sadness.  The patient feels that she has good  ability to concentrate on daily/personal tasks.    --the risk of post-partum blues/depression is significantly elevated in those with a history of depression (approx. 75-80%).      -- the patient is currently on Zoloft 50 mg, a SSRI (Zoloft, class C). Risks discussed in regards to the use of SSRI in the third trimester with risk of feeding difficulty, respiratory issues, pulmonary hypertension and rarely seizures.      Thank you for allowing us to participate in the care of your patient. We anticipate she should be able to continue her care under your supervision. Please do not hesitate to contact us should any concerns arise.    Patient seen and discussed with Dr. Marin.    Mikhail Fonseca MD  Fellow, Maternal-Fetal Medicine

## 2023-12-06 NOTE — ASSESSMENT & PLAN NOTE
reviewed the implications of this illness during pregnancy and offered counseling and the ability for consultation with psychiatrist.    --the pt does not have a psychiatrist and does not want counseling    -- the patient states that she does not need to meet with anyone at the current time.     --the patient denies insomnia, guilt, loss of energy, anorrhexia, or suicidal/homicidal ideation. She states that she occassionally feels sadness.  The patient feels that she has good ability to concentrate on daily/personal tasks.    --the risk of post-partum blues/depression is significantly elevated in those with a history of depression (approx. 75-80%).      -- the patient is currently on Zoloft 50 mg, a SSRI (Zoloft, class C). Risks discussed in regards to the use of SSRI in the third trimester with risk of feeding difficulty, respiratory issues, pulmonary hypertension and rarely seizures.

## 2023-12-07 ENCOUNTER — LAB (OUTPATIENT)
Dept: LAB | Facility: LAB | Age: 33
End: 2023-12-07
Payer: COMMERCIAL

## 2023-12-07 DIAGNOSIS — O99.341 DEPRESSION DURING PREGNANCY IN FIRST TRIMESTER (HHS-HCC): ICD-10-CM

## 2023-12-07 DIAGNOSIS — F32.A DEPRESSION DURING PREGNANCY IN FIRST TRIMESTER (HHS-HCC): ICD-10-CM

## 2023-12-07 DIAGNOSIS — O09.299 H/O PRE-ECLAMPSIA IN PRIOR PREGNANCY, CURRENTLY PREGNANT (HHS-HCC): ICD-10-CM

## 2023-12-07 DIAGNOSIS — O09.91 SUPERVISION OF HIGH RISK PREGNANCY IN FIRST TRIMESTER (HHS-HCC): ICD-10-CM

## 2023-12-07 LAB
B2 MICROGLOB SERPL-MCNC: 1.3 MG/L (ref 0.7–2.2)
CARDIOLIPIN IGA SERPL-ACNC: <0.5 APL U/ML
CARDIOLIPIN IGG SER IA-ACNC: <1.6 GPL U/ML
CARDIOLIPIN IGM SER IA-ACNC: 0.8 MPL U/ML
DRVVT SCREEN TO CONFIRM RATIO: 0.95 RATIO
DRVVT/DRVVT CFM NRMLZD PPP-RTO: 0.93 RATIO
DRVVT/DRVVT CFM P DOAC NEUT NORM PPP-RTO: 1.02 RATIO
LA 2 SCREEN W REFLEX-IMP: NORMAL
NORMALIZED SCT PPP-RTO: 0.82 RATIO
SILICA CLOTTING TIME CONFIRMATION: 0.84 RATIO
SILICA CLOTTING TIME SCREEN: 0.68 RATIO

## 2023-12-07 PROCEDURE — 85613 RUSSELL VIPER VENOM DILUTED: CPT

## 2023-12-07 PROCEDURE — 86147 CARDIOLIPIN ANTIBODY EA IG: CPT

## 2023-12-07 PROCEDURE — 85730 THROMBOPLASTIN TIME PARTIAL: CPT

## 2023-12-07 PROCEDURE — 82232 ASSAY OF BETA-2 PROTEIN: CPT

## 2023-12-07 PROCEDURE — 36415 COLL VENOUS BLD VENIPUNCTURE: CPT

## 2023-12-11 NOTE — PROGRESS NOTES
SUBJECTIVE  Meaghan Gaona is a 33 y.o.  at 12w4d with an estimated date of delivery of 2024, by Last Menstrual Period who presents for a routine prenatal visit.    She denies loss of fluid, vaginal bleeding, regular contractions/cramping, and decreased fetal movement.     Some headaches. Planning to try tylenol. Will message if not improving.    OBJECTIVE  Vitals:    23 1502   BP: 127/80   Weight: 70.8 kg (156 lb)          ASSESSMENT & PLAN    11 weeks gestation of pregnancy  - S/p MFM  - cffDNA pending    Follow up in 4 weeks for return OB visit.    Edna East MD  Obstetrics & Gynecology  23

## 2023-12-12 ENCOUNTER — ROUTINE PRENATAL (OUTPATIENT)
Dept: OBSTETRICS AND GYNECOLOGY | Facility: HOSPITAL | Age: 33
End: 2023-12-12
Payer: COMMERCIAL

## 2023-12-12 VITALS — SYSTOLIC BLOOD PRESSURE: 127 MMHG | WEIGHT: 156 LBS | DIASTOLIC BLOOD PRESSURE: 80 MMHG | BODY MASS INDEX: 28.53 KG/M2

## 2023-12-12 DIAGNOSIS — F32.A DEPRESSION DURING PREGNANCY IN FIRST TRIMESTER (HHS-HCC): ICD-10-CM

## 2023-12-12 DIAGNOSIS — O09.299 H/O PRE-ECLAMPSIA IN PRIOR PREGNANCY, CURRENTLY PREGNANT (HHS-HCC): ICD-10-CM

## 2023-12-12 DIAGNOSIS — Z3A.11 11 WEEKS GESTATION OF PREGNANCY (HHS-HCC): ICD-10-CM

## 2023-12-12 DIAGNOSIS — O99.341 DEPRESSION DURING PREGNANCY IN FIRST TRIMESTER (HHS-HCC): ICD-10-CM

## 2023-12-12 PROCEDURE — 0501F PRENATAL FLOW SHEET: CPT | Performed by: OBSTETRICS & GYNECOLOGY

## 2023-12-12 RX ORDER — NAPROXEN SODIUM 220 MG/1
81 TABLET, FILM COATED ORAL DAILY
COMMUNITY
End: 2024-06-03 | Stop reason: HOSPADM

## 2024-01-09 ENCOUNTER — ROUTINE PRENATAL (OUTPATIENT)
Dept: OBSTETRICS AND GYNECOLOGY | Facility: HOSPITAL | Age: 34
End: 2024-01-09
Payer: COMMERCIAL

## 2024-01-09 VITALS — BODY MASS INDEX: 29.45 KG/M2 | SYSTOLIC BLOOD PRESSURE: 124 MMHG | WEIGHT: 161 LBS | DIASTOLIC BLOOD PRESSURE: 81 MMHG

## 2024-01-09 DIAGNOSIS — Z3A.16 16 WEEKS GESTATION OF PREGNANCY (HHS-HCC): Primary | ICD-10-CM

## 2024-01-09 DIAGNOSIS — O99.341 DEPRESSION DURING PREGNANCY IN FIRST TRIMESTER (HHS-HCC): ICD-10-CM

## 2024-01-09 DIAGNOSIS — Z3A.11 11 WEEKS GESTATION OF PREGNANCY (HHS-HCC): ICD-10-CM

## 2024-01-09 DIAGNOSIS — O09.299 H/O PRE-ECLAMPSIA IN PRIOR PREGNANCY, CURRENTLY PREGNANT (HHS-HCC): ICD-10-CM

## 2024-01-09 DIAGNOSIS — F32.A DEPRESSION DURING PREGNANCY IN FIRST TRIMESTER (HHS-HCC): ICD-10-CM

## 2024-01-09 PROCEDURE — 0501F PRENATAL FLOW SHEET: CPT

## 2024-01-09 ASSESSMENT — ENCOUNTER SYMPTOMS
DEPRESSION: 0
LOSS OF SENSATION IN FEET: 0
OCCASIONAL FEELINGS OF UNSTEADINESS: 0

## 2024-01-09 NOTE — PROGRESS NOTES
Assessment/Plan   Problem List Items Addressed This Visit             ICD-10-CM    Depression during pregnancy in first trimester O99.341, F32.A    11 weeks gestation of pregnancy Z3A.11    H/O pre-eclampsia in prior pregnancy, currently pregnant O09.299     Other Visit Diagnoses         Codes    16 weeks gestation of pregnancy    -  Primary Z3A.16    Relevant Orders    Follow Up In Obstetrics          Anatomy US scheduled  No concerns today  Reviewed s/sx of PTL, warning signs, fetal movement counts, and when to call provider  Follow up in 4 weeks for a routine prenatal visit.    Mirta Rasheed, MAMADOU-CAT    Subjective     Meaghan Gaona is a 33 y.o.  at 16w5d with a working estimated date of delivery of 2024, by Last Menstrual Period who presents for a routine prenatal visit. She denies vaginal bleeding, leakage of fluid, decreased fetal movements, or contractions.    Her pregnancy is complicated by:  Pregnancy Problems (from 23 to present)       Problem Noted Resolved    Depression during pregnancy in first trimester 2023 by Ana Lau No    Overview Signed 2023  5:13 PM by Edna East MD     - Well controlled on Zoloft 50 mg  - Declines counseling           11 weeks gestation of pregnancy 2023 by Ana Lau No    Overview Addendum 2023  5:09 PM by Edna East MD     [x] Initial BMI: 26  [x] Dating US: 8 wk US 23  [x] Prenatal Labs:   [x] Pap:   [x] Genetic Screening:    [x] bASA: 12 weeks  [] Anatomy US:  [] 1hr GCT at 24-28wks:  [] Tdap (27-36wks):  [x] Flu/COVID: Counseled  [] Rhogam (if Rh neg):   [] GBS at 36 wks:  [x] Breastfeeding: Yes  [x] Postpartum Birth control method: POP  [] 39 weeks discussion of IOL vs. Expectant management:  [x] Mode of delivery: Anticipate          H/O pre-eclampsia in prior pregnancy, currently pregnant 2023 by Ana Lau No    Overview Addendum 2023  3:49 PM by Mikhail Fonseca MD     - Admitted for  sPEC and delivered at 31 weeks in prior pregnancy  - MFM consult given history of FGR and sPEC to determine US timing  - Normotensive at new OB, no signs of chronic HTN  [ ] growth US Q 4 weeks starting at 24 weeks                    Objective   Physical Exam  Weight: 73 kg (161 lb)  TW.35 kg (14 lb)  Expected Total Weight Gain: 7 kg (15 lb)-11.5 kg (25 lb)   Pregravid BMI: 26.88  BP: 124/81  Fetal Heart Rate: 145      Postpartum Depression: Medium Risk (2023)    [place]  Depression Scale     Last EPDS Total Score: 7     Last EPDS Self Harm Result: Never       Prenatal Labs  Lab Results   Component Value Date    HGB 12.8 11/15/2023    HCT 37.8 11/15/2023     11/15/2023    ABO O 11/15/2023    LABRH NEG 11/15/2023    ABID Anti-D Acquired 2023    NEISSGONOAMP Negative 2023    CHLAMTRACAMP Negative 2023    SYPHT Nonreactive 11/15/2023    HEPBSAG Nonreactive 11/15/2023    HIV1X2 Nonreactive 11/15/2023    URINECULTURE No growth 2023    GLUC1P 107 2023

## 2024-02-05 ENCOUNTER — HOSPITAL ENCOUNTER (OUTPATIENT)
Dept: RADIOLOGY | Facility: HOSPITAL | Age: 34
Discharge: HOME | End: 2024-02-05
Payer: COMMERCIAL

## 2024-02-05 DIAGNOSIS — Z36.89 SCREENING, ANTENATAL, FOR FETAL ANATOMIC SURVEY (HHS-HCC): ICD-10-CM

## 2024-02-05 PROCEDURE — 76820 UMBILICAL ARTERY ECHO: CPT

## 2024-02-05 PROCEDURE — 76811 OB US DETAILED SNGL FETUS: CPT | Performed by: OBSTETRICS & GYNECOLOGY

## 2024-02-05 PROCEDURE — 76820 UMBILICAL ARTERY ECHO: CPT | Performed by: OBSTETRICS & GYNECOLOGY

## 2024-02-05 PROCEDURE — 76811 OB US DETAILED SNGL FETUS: CPT

## 2024-02-06 ENCOUNTER — ROUTINE PRENATAL (OUTPATIENT)
Dept: OBSTETRICS AND GYNECOLOGY | Facility: HOSPITAL | Age: 34
End: 2024-02-06
Payer: COMMERCIAL

## 2024-02-06 VITALS — BODY MASS INDEX: 28.9 KG/M2 | SYSTOLIC BLOOD PRESSURE: 129 MMHG | WEIGHT: 158 LBS | DIASTOLIC BLOOD PRESSURE: 77 MMHG

## 2024-02-06 DIAGNOSIS — Z3A.16 16 WEEKS GESTATION OF PREGNANCY (HHS-HCC): ICD-10-CM

## 2024-02-06 DIAGNOSIS — F32.A DEPRESSION DURING PREGNANCY IN FIRST TRIMESTER (HHS-HCC): Primary | ICD-10-CM

## 2024-02-06 DIAGNOSIS — O99.341 DEPRESSION DURING PREGNANCY IN FIRST TRIMESTER (HHS-HCC): Primary | ICD-10-CM

## 2024-02-06 DIAGNOSIS — F32.A DEPRESSION, UNSPECIFIED: ICD-10-CM

## 2024-02-06 PROBLEM — O36.5990 FETAL GROWTH RESTRICTION ANTEPARTUM (HHS-HCC): Status: ACTIVE | Noted: 2024-02-06

## 2024-02-06 PROBLEM — O09.90 SUPERVISION OF HIGH RISK PREGNANCY, ANTEPARTUM (HHS-HCC): Status: ACTIVE | Noted: 2023-11-12

## 2024-02-06 PROCEDURE — 0501F PRENATAL FLOW SHEET: CPT | Performed by: OBSTETRICS & GYNECOLOGY

## 2024-02-06 RX ORDER — SERTRALINE HYDROCHLORIDE 50 MG/1
50 TABLET, FILM COATED ORAL DAILY
Qty: 90 TABLET | Refills: 3 | Status: SHIPPED | OUTPATIENT
Start: 2024-02-06

## 2024-02-06 NOTE — PROGRESS NOTES
SUBJECTIVE  Meaghan Gaona is a 33 y.o.  at 20w5d with an estimated date of delivery of 2024, by Last Menstrual Period who presents for a routine prenatal visit.    She denies loss of fluid, vaginal bleeding, regular contractions/cramping, and decreased fetal movement. She reports occasional lightheadedness. Reviewed recommendation for compressions stockings, elevation of legs, and frequent breaks. Discussed if lightheadedness not improved with rest recommend triage evaluation.     OBJECTIVE  Vitals:    24 1503   BP: 129/77   Weight: 71.7 kg (158 lb)          ASSESSMENT & PLAN    Supervision of high risk pregnancy, antepartum  - Reviewed OGTT, T&S, CBC next visit    H/O pre-eclampsia in prior pregnancy, currently pregnant  - Compliant with bASA  - Discussed taking BP daily at home    Depression during pregnancy in first trimester  - Well controlled on Zoloft 50 mg    Fetal growth restriction antepartum  - 8%ile on anatomy US  - Growth US in 3 weeks    Follow up in 4 weeks for return OB visit.    Edna East MD  Obstetrics & Gynecology  24

## 2024-02-16 ENCOUNTER — APPOINTMENT (OUTPATIENT)
Dept: CARDIOLOGY | Facility: HOSPITAL | Age: 34
End: 2024-02-16
Payer: COMMERCIAL

## 2024-02-16 ENCOUNTER — HOSPITAL ENCOUNTER (OUTPATIENT)
Facility: HOSPITAL | Age: 34
End: 2024-02-16
Attending: OBSTETRICS & GYNECOLOGY | Admitting: OBSTETRICS & GYNECOLOGY
Payer: COMMERCIAL

## 2024-02-16 ENCOUNTER — HOSPITAL ENCOUNTER (OUTPATIENT)
Facility: HOSPITAL | Age: 34
Discharge: HOME | End: 2024-02-16
Attending: OBSTETRICS & GYNECOLOGY | Admitting: OBSTETRICS & GYNECOLOGY
Payer: COMMERCIAL

## 2024-02-16 VITALS
HEART RATE: 71 BPM | RESPIRATION RATE: 16 BRPM | SYSTOLIC BLOOD PRESSURE: 125 MMHG | BODY MASS INDEX: 29.78 KG/M2 | OXYGEN SATURATION: 98 % | TEMPERATURE: 98.1 F | WEIGHT: 161.82 LBS | HEIGHT: 62 IN | DIASTOLIC BLOOD PRESSURE: 70 MMHG

## 2024-02-16 LAB
ERYTHROCYTE [DISTWIDTH] IN BLOOD BY AUTOMATED COUNT: 13.1 % (ref 11.5–14.5)
HCT VFR BLD AUTO: 32.4 % (ref 36–46)
HGB BLD-MCNC: 11.3 G/DL (ref 12–16)
MCH RBC QN AUTO: 30 PG (ref 26–34)
MCHC RBC AUTO-ENTMCNC: 34.9 G/DL (ref 32–36)
MCV RBC AUTO: 86 FL (ref 80–100)
NRBC BLD-RTO: 0 /100 WBCS (ref 0–0)
PLATELET # BLD AUTO: 204 X10*3/UL (ref 150–450)
POC APPEARANCE, URINE: CLEAR
POC BILIRUBIN, URINE: NEGATIVE
POC BLOOD, URINE: NEGATIVE
POC COLOR, URINE: YELLOW
POC GLUCOSE, URINE: NEGATIVE MG/DL
POC KETONES, URINE: NEGATIVE MG/DL
POC LEUKOCYTES, URINE: NEGATIVE
POC NITRITE,URINE: NEGATIVE
POC PH, URINE: 7 PH
POC PROTEIN, URINE: NEGATIVE MG/DL
POC SPECIFIC GRAVITY, URINE: 1.01
POC UROBILINOGEN, URINE: 0.2 EU/DL
RBC # BLD AUTO: 3.77 X10*6/UL (ref 4–5.2)
WBC # BLD AUTO: 8.5 X10*3/UL (ref 4.4–11.3)

## 2024-02-16 PROCEDURE — 85027 COMPLETE CBC AUTOMATED: CPT

## 2024-02-16 PROCEDURE — 99213 OFFICE O/P EST LOW 20 MIN: CPT

## 2024-02-16 PROCEDURE — 99215 OFFICE O/P EST HI 40 MIN: CPT | Mod: 25

## 2024-02-16 PROCEDURE — 59025 FETAL NON-STRESS TEST: CPT

## 2024-02-16 PROCEDURE — 93010 ELECTROCARDIOGRAM REPORT: CPT | Performed by: INTERNAL MEDICINE

## 2024-02-16 PROCEDURE — 36415 COLL VENOUS BLD VENIPUNCTURE: CPT

## 2024-02-16 PROCEDURE — 93005 ELECTROCARDIOGRAM TRACING: CPT

## 2024-02-16 SDOH — HEALTH STABILITY: MENTAL HEALTH: WERE YOU ABLE TO COMPLETE ALL THE BEHAVIORAL HEALTH SCREENINGS?: YES

## 2024-02-16 SDOH — HEALTH STABILITY: MENTAL HEALTH: HAVE YOU USED ANY PRESCRIPTION DRUGS OTHER THAN PRESCRIBED IN THE PAST 12 MONTHS?: NO

## 2024-02-16 SDOH — ECONOMIC STABILITY: HOUSING INSECURITY: DO YOU FEEL UNSAFE GOING BACK TO THE PLACE WHERE YOU ARE LIVING?: NO

## 2024-02-16 SDOH — SOCIAL STABILITY: SOCIAL INSECURITY: HAVE YOU HAD THOUGHTS OF HARMING ANYONE ELSE?: NO

## 2024-02-16 SDOH — HEALTH STABILITY: MENTAL HEALTH: HAVE YOU USED ANY SUBSTANCES (CANABIS, COCAINE, HEROIN, HALLUCINOGENS, INHALANTS, ETC.) IN THE PAST 12 MONTHS?: NO

## 2024-02-16 SDOH — HEALTH STABILITY: MENTAL HEALTH: WISH TO BE DEAD (PAST 1 MONTH): NO

## 2024-02-16 SDOH — SOCIAL STABILITY: SOCIAL INSECURITY: ARE YOU OR HAVE YOU BEEN THREATENED OR ABUSED PHYSICALLY, EMOTIONALLY, OR SEXUALLY BY ANYONE?: NO

## 2024-02-16 SDOH — HEALTH STABILITY: MENTAL HEALTH: SUICIDAL BEHAVIOR (LIFETIME): NO

## 2024-02-16 SDOH — SOCIAL STABILITY: SOCIAL INSECURITY: ABUSE SCREEN: ADULT

## 2024-02-16 SDOH — SOCIAL STABILITY: SOCIAL INSECURITY: VERBAL ABUSE: DENIES

## 2024-02-16 SDOH — HEALTH STABILITY: MENTAL HEALTH: NON-SPECIFIC ACTIVE SUICIDAL THOUGHTS (PAST 1 MONTH): NO

## 2024-02-16 SDOH — SOCIAL STABILITY: SOCIAL INSECURITY: ARE THERE ANY APPARENT SIGNS OF INJURIES/BEHAVIORS THAT COULD BE RELATED TO ABUSE/NEGLECT?: NO

## 2024-02-16 SDOH — SOCIAL STABILITY: SOCIAL INSECURITY: PHYSICAL ABUSE: DENIES

## 2024-02-16 SDOH — SOCIAL STABILITY: SOCIAL INSECURITY: DO YOU FEEL ANYONE HAS EXPLOITED OR TAKEN ADVANTAGE OF YOU FINANCIALLY OR OF YOUR PERSONAL PROPERTY?: NO

## 2024-02-16 SDOH — SOCIAL STABILITY: SOCIAL INSECURITY: DOES ANYONE TRY TO KEEP YOU FROM HAVING/CONTACTING OTHER FRIENDS OR DOING THINGS OUTSIDE YOUR HOME?: NO

## 2024-02-16 SDOH — SOCIAL STABILITY: SOCIAL INSECURITY: HAS ANYONE EVER THREATENED TO HURT YOUR FAMILY OR YOUR PETS?: NO

## 2024-02-16 ASSESSMENT — PAIN SCALES - GENERAL
PAINLEVEL_OUTOF10: 0 - NO PAIN

## 2024-02-16 ASSESSMENT — PATIENT HEALTH QUESTIONNAIRE - PHQ9
SUM OF ALL RESPONSES TO PHQ9 QUESTIONS 1 & 2: 0
1. LITTLE INTEREST OR PLEASURE IN DOING THINGS: NOT AT ALL
2. FEELING DOWN, DEPRESSED OR HOPELESS: NOT AT ALL

## 2024-02-16 ASSESSMENT — LIFESTYLE VARIABLES
HOW OFTEN DO YOU HAVE A DRINK CONTAINING ALCOHOL: NEVER
AUDIT-C TOTAL SCORE: 0
HOW OFTEN DO YOU HAVE 6 OR MORE DRINKS ON ONE OCCASION: NEVER
HOW MANY STANDARD DRINKS CONTAINING ALCOHOL DO YOU HAVE ON A TYPICAL DAY: PATIENT DOES NOT DRINK
AUDIT-C TOTAL SCORE: 0
SKIP TO QUESTIONS 9-10: 1

## 2024-02-16 NOTE — H&P
Obstetrical TRIAGE History and Physical     Meaghan Gaona is a 33 y.o. . 22w1d d/b LMP c/w 11wk US    Chief Complaint: dizzy    Assessment/Plan    Dizziness  - VSS  - EKG NSR  - Orthostatics negative  - Hgb 11.3  - Recommend compression socks, increased hydration, frequent small meals, avoid prolonged periods of standing  - Reviewed return precautions     IUP at 22w1d   - FHR 145bpm   - Continue routine prenatal care    Maternal Well-being  - Vital signs stable and WNL  - Emotional support and reassurance provided  - All questions and concerns addressed     D/w MD Madison Lopez PA-C     Active Problems:  There are no active Hospital Problems.      Pregnancy Problems (from 23 to present)       Problem Noted Resolved    Fetal growth restriction antepartum 2024 by Edna East MD No    Priority:  Medium      Depression during pregnancy in first trimester 2023 by Ana Lau No    Priority:  Medium      Overview Addendum 2024  3:20 PM by Edna East MD     - Well controlled on Zoloft 50 mg  - Declines counseling         Supervision of high risk pregnancy, antepartum 2023 by Ana Lau No    Priority:  Medium      Overview Addendum 2024  3:19 PM by Edna East MD     [x] Initial BMI: 26  [x] Dating US: 8 wk US 23  [x] Prenatal Labs:   [x] Pap:   [x] Genetic Screening:    [x] bASA: 12 weeks  [x] Anatomy US: Normal - FGR  [] 1hr GCT at 24-28wks:  [] Tdap (27-36wks):  [x] Flu/COVID: Counseled  [] Rhogam (if Rh neg):   [] GBS at 36 wks:  [x] Breastfeeding: Yes  [x] Postpartum Birth control method: POP  [] 39 weeks discussion of IOL vs. Expectant management:  [x] Mode of delivery: Anticipate          H/O pre-eclampsia in prior pregnancy, currently pregnant 2023 by Ana Lau No    Priority:  Medium      Overview Addendum 2023  3:49 PM by Mikhail Fonseca, MD     - Admitted for sPEC and delivered at 31 weeks in prior  pregnancy  - MFM consult given history of FGR and sPEC to determine US timing  - Normotensive at new OB, no signs of chronic HTN  [ ] growth US Q 4 weeks starting at 24 weeks                 Subjective   Meaghan is here complaining of dizziness. She reports she has felt dizziness intermittently for a few weeks but worse in the last few days where she has felt increased lightheadedness. She denies syncope. Denies the dizziness being associated with position changes, headaches, n/v, or chest pain. She says when she gets the dizzy spells it does not matter if she is standing or laying, it is constant. Good fetal movement. Denies vaginal bleeding., Denies contractions., Denies leaking of fluid.         Obstetrical History   OB History    Para Term  AB Living   2 1   1   1   SAB IAB Ectopic Multiple Live Births           1      # Outcome Date GA Lbr Matias/2nd Weight Sex Delivery Anes PTL Lv   2 Current            1  23 31w1d  1.474 kg  Vag-Spont   ALEXEI       Past Medical History  Past Medical History:   Diagnosis Date    Depression         Past Surgical History   Past Surgical History:   Procedure Laterality Date    MR CHEST ANGIO WO IV CONTRAST  2019    MR CHEST ANGIO WO IV CONTRAST 2019 [facility]       Social History  Social History     Tobacco Use    Smoking status: Never    Smokeless tobacco: Never   Substance Use Topics    Alcohol use: Not Currently     Substance and Sexual Activity   Drug Use Never       Allergies  Patient has no known allergies.     Medications  Medications Prior to Admission   Medication Sig Dispense Refill Last Dose    aspirin 81 mg chewable tablet Chew 1 tablet (81 mg) once daily.   2024    PNV no.95/ferrous fum/folic ac (PRENATAL ORAL) Take 1 tablet by mouth once daily.   2024    sertraline (Zoloft) 50 mg tablet Take 1 tablet (50 mg) by mouth once daily. as directed 90 tablet 3 2024       Objective    Last Vitals  Temp Pulse Resp BP MAP  O2 Sat   36.7 °C (98.1 °F) 74 16 117/73   98 %     Physical Examination  GENERAL: Examination reveals a well developed, well nourished, gravid female in no acute distress. She is alert and cooperative.  ABDOMEN: soft, gravid, nontender, nondistended, no abnormal masses, no epigastric pain  FHR is 145 BPM  SKIN: normal coloration and turgor, no rashes  PSYCHOLOGICAL: awake and alert; oriented to person, place, and time    Lab Review  Labs in chart were reviewed.

## 2024-02-18 LAB
ATRIAL RATE: 67 BPM
P AXIS: 30 DEGREES
P OFFSET: 186 MS
P ONSET: 145 MS
PR INTERVAL: 156 MS
Q ONSET: 223 MS
QRS COUNT: 11 BEATS
QRS DURATION: 78 MS
QT INTERVAL: 412 MS
QTC CALCULATION(BAZETT): 435 MS
QTC FREDERICIA: 427 MS
R AXIS: 65 DEGREES
T AXIS: 39 DEGREES
T OFFSET: 429 MS
VENTRICULAR RATE: 67 BPM

## 2024-02-27 ENCOUNTER — HOSPITAL ENCOUNTER (OUTPATIENT)
Dept: RADIOLOGY | Facility: HOSPITAL | Age: 34
Discharge: HOME | End: 2024-02-27
Payer: COMMERCIAL

## 2024-02-27 DIAGNOSIS — Z36.89 SCREENING, ANTENATAL, FOR FETAL ANATOMIC SURVEY (HHS-HCC): ICD-10-CM

## 2024-02-27 DIAGNOSIS — Z34.90 ENCOUNTER FOR SUPERVISION OF NORMAL PREGNANCY, UNSPECIFIED, UNSPECIFIED TRIMESTER (HHS-HCC): ICD-10-CM

## 2024-02-27 DIAGNOSIS — Z87.59 HISTORY OF PRIOR PREGNANCY WITH IUGR NEWBORN: ICD-10-CM

## 2024-02-27 PROCEDURE — 76816 OB US FOLLOW-UP PER FETUS: CPT

## 2024-02-27 PROCEDURE — 76816 OB US FOLLOW-UP PER FETUS: CPT | Performed by: OBSTETRICS & GYNECOLOGY

## 2024-03-01 ENCOUNTER — ROUTINE PRENATAL (OUTPATIENT)
Dept: OBSTETRICS AND GYNECOLOGY | Facility: HOSPITAL | Age: 34
End: 2024-03-01
Payer: COMMERCIAL

## 2024-03-01 VITALS — BODY MASS INDEX: 29.63 KG/M2 | SYSTOLIC BLOOD PRESSURE: 116 MMHG | WEIGHT: 162 LBS | DIASTOLIC BLOOD PRESSURE: 76 MMHG

## 2024-03-01 DIAGNOSIS — O99.341 DEPRESSION DURING PREGNANCY IN FIRST TRIMESTER (HHS-HCC): ICD-10-CM

## 2024-03-01 DIAGNOSIS — O09.90 SUPERVISION OF HIGH RISK PREGNANCY, ANTEPARTUM (HHS-HCC): ICD-10-CM

## 2024-03-01 DIAGNOSIS — O09.299 H/O PRE-ECLAMPSIA IN PRIOR PREGNANCY, CURRENTLY PREGNANT (HHS-HCC): ICD-10-CM

## 2024-03-01 DIAGNOSIS — F32.A DEPRESSION DURING PREGNANCY IN FIRST TRIMESTER (HHS-HCC): ICD-10-CM

## 2024-03-01 DIAGNOSIS — O36.5990 FETAL GROWTH RESTRICTION ANTEPARTUM (HHS-HCC): ICD-10-CM

## 2024-03-01 PROCEDURE — 0501F PRENATAL FLOW SHEET: CPT | Performed by: OBSTETRICS & GYNECOLOGY

## 2024-03-01 ASSESSMENT — ENCOUNTER SYMPTOMS
DEPRESSION: 0
OCCASIONAL FEELINGS OF UNSTEADINESS: 0
LOSS OF SENSATION IN FEET: 0

## 2024-03-01 NOTE — PROGRESS NOTES
SUBJECTIVE  Meaghan Gaona is a 33 y.o.  at 24w1d with an estimated date of delivery of 2024, by Last Menstrual Period who presents for a routine prenatal visit.    She denies loss of fluid, vaginal bleeding, regular contractions/cramping, and decreased fetal movement.     OBJECTIVE  Vitals:    24 1456   BP: 116/76   Weight: 73.5 kg (162 lb)          ASSESSMENT & PLAN    Fetal growth restriction antepartum  - Resolved on most recent US    Supervision of high risk pregnancy, antepartum  - OGTT, CBC, syphilis today    H/O pre-eclampsia in prior pregnancy, currently pregnant  - q4week US    Follow up in 2-3 weeks for return OB visit.    Edna East MD  Obstetrics & Gynecology  24

## 2024-03-05 ENCOUNTER — LAB (OUTPATIENT)
Dept: LAB | Facility: LAB | Age: 34
End: 2024-03-05
Payer: COMMERCIAL

## 2024-03-05 DIAGNOSIS — O09.90 SUPERVISION OF HIGH RISK PREGNANCY, ANTEPARTUM (HHS-HCC): ICD-10-CM

## 2024-03-05 LAB
ERYTHROCYTE [DISTWIDTH] IN BLOOD BY AUTOMATED COUNT: 13.2 % (ref 11.5–14.5)
GLUCOSE 1H P 50 G GLC PO SERPL-MCNC: 124 MG/DL
HCT VFR BLD AUTO: 35.9 % (ref 36–46)
HGB BLD-MCNC: 12.1 G/DL (ref 12–16)
MCH RBC QN AUTO: 29.2 PG (ref 26–34)
MCHC RBC AUTO-ENTMCNC: 33.7 G/DL (ref 32–36)
MCV RBC AUTO: 87 FL (ref 80–100)
NRBC BLD-RTO: 0 /100 WBCS (ref 0–0)
PLATELET # BLD AUTO: 228 X10*3/UL (ref 150–450)
RBC # BLD AUTO: 4.14 X10*6/UL (ref 4–5.2)
REFLEX ADDED, ANEMIA PANEL: NORMAL
TREPONEMA PALLIDUM IGG+IGM AB [PRESENCE] IN SERUM OR PLASMA BY IMMUNOASSAY: NONREACTIVE
WBC # BLD AUTO: 8.8 X10*3/UL (ref 4.4–11.3)

## 2024-03-05 PROCEDURE — 82947 ASSAY GLUCOSE BLOOD QUANT: CPT

## 2024-03-05 PROCEDURE — 85027 COMPLETE CBC AUTOMATED: CPT

## 2024-03-05 PROCEDURE — 86780 TREPONEMA PALLIDUM: CPT

## 2024-03-05 PROCEDURE — 36415 COLL VENOUS BLD VENIPUNCTURE: CPT

## 2024-03-12 ENCOUNTER — APPOINTMENT (OUTPATIENT)
Dept: OBSTETRICS AND GYNECOLOGY | Facility: HOSPITAL | Age: 34
End: 2024-03-12
Payer: COMMERCIAL

## 2024-03-19 ENCOUNTER — ROUTINE PRENATAL (OUTPATIENT)
Dept: OBSTETRICS AND GYNECOLOGY | Facility: HOSPITAL | Age: 34
End: 2024-03-19
Payer: COMMERCIAL

## 2024-03-19 VITALS — DIASTOLIC BLOOD PRESSURE: 73 MMHG | WEIGHT: 165 LBS | BODY MASS INDEX: 30.18 KG/M2 | SYSTOLIC BLOOD PRESSURE: 119 MMHG

## 2024-03-19 DIAGNOSIS — O09.90 SUPERVISION OF HIGH RISK PREGNANCY, ANTEPARTUM (HHS-HCC): ICD-10-CM

## 2024-03-19 PROCEDURE — 0501F PRENATAL FLOW SHEET: CPT | Performed by: OBSTETRICS & GYNECOLOGY

## 2024-03-19 ASSESSMENT — ENCOUNTER SYMPTOMS
LOSS OF SENSATION IN FEET: 0
OCCASIONAL FEELINGS OF UNSTEADINESS: 0
DEPRESSION: 0

## 2024-03-19 NOTE — ASSESSMENT & PLAN NOTE
- BP normal today  - Review ACOG recommendation for weekly NSTs at 32 weeks for previous FGR/PreE requiring  delivery

## 2024-03-19 NOTE — PROGRESS NOTES
SUBJECTIVE  Meaghan Gaona is a 33 y.o.  at 26w5d with an estimated date of delivery of 2024, by Last Menstrual Period who presents for a routine prenatal visit.    She denies loss of fluid, vaginal bleeding, regular contractions/cramping, and decreased fetal movement.     OBJECTIVE  Vitals:    24 1504   BP: 119/73   Weight: 74.8 kg (165 lb)          ASSESSMENT & PLAN    Fetal growth restriction antepartum  - Resolved on most US, plan for q4 week growths    H/O pre-eclampsia in prior pregnancy, currently pregnant  - BP normal today  - Review ACOG recommendation for weekly NSTs at 32 weeks for previous FGR/PreE requiring  delivery    Supervision of high risk pregnancy, antepartum  - Up to date on care    Follow up in 2 weeks for return OB visit.    Edna East MD  Obstetrics & Gynecology  24

## 2024-03-27 ENCOUNTER — HOSPITAL ENCOUNTER (OUTPATIENT)
Dept: RADIOLOGY | Facility: HOSPITAL | Age: 34
Discharge: HOME | End: 2024-03-27
Payer: COMMERCIAL

## 2024-03-27 DIAGNOSIS — Z36.89 SCREENING, ANTENATAL, FOR FETAL ANATOMIC SURVEY (HHS-HCC): ICD-10-CM

## 2024-03-27 PROCEDURE — 76820 UMBILICAL ARTERY ECHO: CPT | Performed by: OBSTETRICS & GYNECOLOGY

## 2024-03-27 PROCEDURE — 76819 FETAL BIOPHYS PROFIL W/O NST: CPT

## 2024-03-27 PROCEDURE — 76816 OB US FOLLOW-UP PER FETUS: CPT | Performed by: OBSTETRICS & GYNECOLOGY

## 2024-03-27 PROCEDURE — 76816 OB US FOLLOW-UP PER FETUS: CPT

## 2024-03-27 PROCEDURE — 76819 FETAL BIOPHYS PROFIL W/O NST: CPT | Performed by: OBSTETRICS & GYNECOLOGY

## 2024-03-27 PROCEDURE — 76820 UMBILICAL ARTERY ECHO: CPT

## 2024-04-03 ENCOUNTER — HOSPITAL ENCOUNTER (OUTPATIENT)
Dept: RADIOLOGY | Facility: HOSPITAL | Age: 34
Discharge: HOME | End: 2024-04-03
Payer: COMMERCIAL

## 2024-04-03 DIAGNOSIS — Z36.89 SCREENING, ANTENATAL, FOR FETAL ANATOMIC SURVEY (HHS-HCC): ICD-10-CM

## 2024-04-03 PROCEDURE — 76820 UMBILICAL ARTERY ECHO: CPT

## 2024-04-03 PROCEDURE — 76819 FETAL BIOPHYS PROFIL W/O NST: CPT

## 2024-04-03 PROCEDURE — 76820 UMBILICAL ARTERY ECHO: CPT | Performed by: OBSTETRICS & GYNECOLOGY

## 2024-04-03 PROCEDURE — 76821 MIDDLE CEREBRAL ARTERY ECHO: CPT | Performed by: OBSTETRICS & GYNECOLOGY

## 2024-04-03 PROCEDURE — 76819 FETAL BIOPHYS PROFIL W/O NST: CPT | Performed by: OBSTETRICS & GYNECOLOGY

## 2024-04-04 ENCOUNTER — ROUTINE PRENATAL (OUTPATIENT)
Dept: OBSTETRICS AND GYNECOLOGY | Facility: HOSPITAL | Age: 34
End: 2024-04-04
Payer: COMMERCIAL

## 2024-04-04 VITALS — WEIGHT: 167 LBS | BODY MASS INDEX: 30.54 KG/M2 | SYSTOLIC BLOOD PRESSURE: 128 MMHG | DIASTOLIC BLOOD PRESSURE: 80 MMHG

## 2024-04-04 DIAGNOSIS — Z67.91 RH NEGATIVE STATE IN ANTEPARTUM PERIOD (HHS-HCC): Primary | ICD-10-CM

## 2024-04-04 DIAGNOSIS — O26.899 RH NEGATIVE STATE IN ANTEPARTUM PERIOD (HHS-HCC): Primary | ICD-10-CM

## 2024-04-04 PROCEDURE — 90715 TDAP VACCINE 7 YRS/> IM: CPT | Performed by: OBSTETRICS & GYNECOLOGY

## 2024-04-04 PROCEDURE — 0501F PRENATAL FLOW SHEET: CPT | Performed by: OBSTETRICS & GYNECOLOGY

## 2024-04-04 PROCEDURE — 96372 THER/PROPH/DIAG INJ SC/IM: CPT | Performed by: OBSTETRICS & GYNECOLOGY

## 2024-04-04 PROCEDURE — 2500000004 HC RX 250 GENERAL PHARMACY W/ HCPCS (ALT 636 FOR OP/ED): Performed by: OBSTETRICS & GYNECOLOGY

## 2024-04-04 RX ADMIN — HUMAN RHO(D) IMMUNE GLOBULIN 300 MCG: 300 INJECTION, SOLUTION INTRAMUSCULAR at 15:01

## 2024-04-04 ASSESSMENT — ENCOUNTER SYMPTOMS
LOSS OF SENSATION IN FEET: 0
OCCASIONAL FEELINGS OF UNSTEADINESS: 0
DEPRESSION: 0

## 2024-04-04 NOTE — ASSESSMENT & PLAN NOTE
- Most recent ultrasound with EFW 9%ile  - Planning weekly dopplers/BPP with q3wk growths with MFM

## 2024-04-04 NOTE — PROGRESS NOTES
SUBJECTIVE  Meaghan Gaona is a 33 y.o.  at 29w0d with an estimated date of delivery of 2024, by Last Menstrual Period who presents for a routine prenatal visit.    She denies loss of fluid, vaginal bleeding, regular contractions/cramping, and decreased fetal movement.     OBJECTIVE  Vitals:    24 1501   BP: 128/80   Weight: 75.8 kg (167 lb)          ASSESSMENT & PLAN    Fetal growth restriction antepartum  - Most recent ultrasound with EFW 9%ile  - Planning weekly dopplers/BPP with q3wk growths with MFM    Supervision of high risk pregnancy, antepartum  - Rhogam today  - Tdap today    Follow up in 2 weeks for return OB visit.    Edna East MD  Obstetrics & Gynecology  24

## 2024-04-11 ENCOUNTER — HOSPITAL ENCOUNTER (OUTPATIENT)
Dept: RADIOLOGY | Facility: HOSPITAL | Age: 34
Discharge: HOME | End: 2024-04-11
Payer: COMMERCIAL

## 2024-04-11 DIAGNOSIS — Z36.89 SCREENING, ANTENATAL, FOR FETAL ANATOMIC SURVEY (HHS-HCC): ICD-10-CM

## 2024-04-11 DIAGNOSIS — O36.5990 IUGR (INTRAUTERINE GROWTH RESTRICTION) AFFECTING CARE OF MOTHER (HHS-HCC): ICD-10-CM

## 2024-04-11 PROCEDURE — 76820 UMBILICAL ARTERY ECHO: CPT | Performed by: MEDICAL GENETICS

## 2024-04-11 PROCEDURE — 76820 UMBILICAL ARTERY ECHO: CPT

## 2024-04-11 PROCEDURE — 76819 FETAL BIOPHYS PROFIL W/O NST: CPT | Performed by: MEDICAL GENETICS

## 2024-04-11 PROCEDURE — 76819 FETAL BIOPHYS PROFIL W/O NST: CPT

## 2024-04-17 ENCOUNTER — HOSPITAL ENCOUNTER (OUTPATIENT)
Dept: RADIOLOGY | Facility: HOSPITAL | Age: 34
Discharge: HOME | End: 2024-04-17
Payer: COMMERCIAL

## 2024-04-17 DIAGNOSIS — Z36.4: ICD-10-CM

## 2024-04-17 DIAGNOSIS — Z36.89 SCREENING, ANTENATAL, FOR FETAL ANATOMIC SURVEY (HHS-HCC): ICD-10-CM

## 2024-04-17 PROCEDURE — 76821 MIDDLE CEREBRAL ARTERY ECHO: CPT | Performed by: OBSTETRICS & GYNECOLOGY

## 2024-04-17 PROCEDURE — 76819 FETAL BIOPHYS PROFIL W/O NST: CPT

## 2024-04-17 PROCEDURE — 76820 UMBILICAL ARTERY ECHO: CPT | Performed by: OBSTETRICS & GYNECOLOGY

## 2024-04-17 PROCEDURE — 76816 OB US FOLLOW-UP PER FETUS: CPT

## 2024-04-17 PROCEDURE — 76819 FETAL BIOPHYS PROFIL W/O NST: CPT | Performed by: OBSTETRICS & GYNECOLOGY

## 2024-04-17 PROCEDURE — 76820 UMBILICAL ARTERY ECHO: CPT

## 2024-04-17 PROCEDURE — 76816 OB US FOLLOW-UP PER FETUS: CPT | Performed by: OBSTETRICS & GYNECOLOGY

## 2024-04-18 ENCOUNTER — ROUTINE PRENATAL (OUTPATIENT)
Dept: OBSTETRICS AND GYNECOLOGY | Facility: HOSPITAL | Age: 34
End: 2024-04-18
Payer: COMMERCIAL

## 2024-04-18 VITALS — WEIGHT: 170 LBS | BODY MASS INDEX: 31.09 KG/M2 | SYSTOLIC BLOOD PRESSURE: 124 MMHG | DIASTOLIC BLOOD PRESSURE: 80 MMHG

## 2024-04-18 DIAGNOSIS — O09.90 SUPERVISION OF HIGH RISK PREGNANCY, ANTEPARTUM (HHS-HCC): ICD-10-CM

## 2024-04-18 DIAGNOSIS — O99.341 DEPRESSION DURING PREGNANCY IN FIRST TRIMESTER (HHS-HCC): ICD-10-CM

## 2024-04-18 DIAGNOSIS — O36.5990 FETAL GROWTH RESTRICTION ANTEPARTUM (HHS-HCC): ICD-10-CM

## 2024-04-18 DIAGNOSIS — F32.A DEPRESSION DURING PREGNANCY IN FIRST TRIMESTER (HHS-HCC): ICD-10-CM

## 2024-04-18 DIAGNOSIS — O09.299 H/O PRE-ECLAMPSIA IN PRIOR PREGNANCY, CURRENTLY PREGNANT (HHS-HCC): ICD-10-CM

## 2024-04-18 PROCEDURE — 0501F PRENATAL FLOW SHEET: CPT | Performed by: OBSTETRICS & GYNECOLOGY

## 2024-04-18 ASSESSMENT — ENCOUNTER SYMPTOMS
OCCASIONAL FEELINGS OF UNSTEADINESS: 0
DEPRESSION: 0
LOSS OF SENSATION IN FEET: 0

## 2024-04-18 NOTE — PROGRESS NOTES
SUBJECTIVE  Meaghan Gaona is a 33 y.o.  at 31w0d with an estimated date of delivery of 2024, by Last Menstrual Period who presents for a routine prenatal visit.    She denies loss of fluid, vaginal bleeding, regular contractions/cramping, and decreased fetal movement.     OBJECTIVE  Vitals:    24 1446   BP: 124/80   Weight: 77.1 kg (170 lb)          ASSESSMENT & PLAN    H/O pre-eclampsia in prior pregnancy, currently pregnant ([facility])  - BP great today!    Fetal growth restriction antepartum ([facility])  - Most recent ultrasound with EFW 5%ile. AC 9%ile  - Planning weekly dopplers/BPP with q3wk growths with MFM    Follow up in 2 weeks for return OB visit.    Edna East MD  Obstetrics & Gynecology  24

## 2024-04-18 NOTE — ASSESSMENT & PLAN NOTE
- Most recent ultrasound with EFW 5%ile. AC 9%ile  - Planning weekly dopplers/BPP with q3wk growths with MFM

## 2024-04-25 ENCOUNTER — HOSPITAL ENCOUNTER (OUTPATIENT)
Dept: RADIOLOGY | Facility: HOSPITAL | Age: 34
Discharge: HOME | End: 2024-04-25
Payer: COMMERCIAL

## 2024-04-25 ENCOUNTER — PATIENT MESSAGE (OUTPATIENT)
Dept: OBSTETRICS AND GYNECOLOGY | Facility: HOSPITAL | Age: 34
End: 2024-04-25
Payer: COMMERCIAL

## 2024-04-25 DIAGNOSIS — Z36.89 SCREENING, ANTENATAL, FOR FETAL ANATOMIC SURVEY (HHS-HCC): ICD-10-CM

## 2024-04-25 DIAGNOSIS — O36.5930 MATERNAL CARE FOR OTHER KNOWN OR SUSPECTED POOR FETAL GROWTH, THIRD TRIMESTER, NOT APPLICABLE OR UNSPECIFIED (HHS-HCC): ICD-10-CM

## 2024-04-25 PROCEDURE — 76818 FETAL BIOPHYS PROFILE W/NST: CPT

## 2024-04-25 PROCEDURE — 76820 UMBILICAL ARTERY ECHO: CPT

## 2024-04-25 NOTE — TELEPHONE ENCOUNTER
From: Meaghan Gaona  To: Edna East  Sent: 4/25/2024 12:06 PM EDT  Subject: Reduced movement question    Hello,     What does [facility] consider reduced movement? I’ve read conflicting things online. Is it a change in pattern? Or 10 movements in an hour? Or something else?     Just wondering because the last day or two there has been a noticeable change in the pattern/amount of movement but I do get 10 movements in an hour. It’s just not as active as normal.     Thanks,   Meaghan

## 2024-05-02 ENCOUNTER — APPOINTMENT (OUTPATIENT)
Dept: RADIOLOGY | Facility: HOSPITAL | Age: 34
End: 2024-05-02
Payer: COMMERCIAL

## 2024-05-02 ENCOUNTER — ROUTINE PRENATAL (OUTPATIENT)
Dept: OBSTETRICS AND GYNECOLOGY | Facility: HOSPITAL | Age: 34
End: 2024-05-02
Payer: COMMERCIAL

## 2024-05-02 ENCOUNTER — HOSPITAL ENCOUNTER (OUTPATIENT)
Dept: RADIOLOGY | Facility: HOSPITAL | Age: 34
Discharge: HOME | End: 2024-05-02
Payer: COMMERCIAL

## 2024-05-02 VITALS — WEIGHT: 171 LBS | DIASTOLIC BLOOD PRESSURE: 75 MMHG | SYSTOLIC BLOOD PRESSURE: 116 MMHG | BODY MASS INDEX: 31.28 KG/M2

## 2024-05-02 DIAGNOSIS — O36.5990 IUGR (INTRAUTERINE GROWTH RESTRICTION) AFFECTING CARE OF MOTHER (HHS-HCC): ICD-10-CM

## 2024-05-02 DIAGNOSIS — Z36.4: ICD-10-CM

## 2024-05-02 DIAGNOSIS — O36.5990 FETAL GROWTH RESTRICTION ANTEPARTUM (HHS-HCC): ICD-10-CM

## 2024-05-02 DIAGNOSIS — O99.341 DEPRESSION DURING PREGNANCY IN FIRST TRIMESTER (HHS-HCC): ICD-10-CM

## 2024-05-02 DIAGNOSIS — O09.90 SUPERVISION OF HIGH RISK PREGNANCY, ANTEPARTUM (HHS-HCC): ICD-10-CM

## 2024-05-02 DIAGNOSIS — O09.299 H/O PRE-ECLAMPSIA IN PRIOR PREGNANCY, CURRENTLY PREGNANT (HHS-HCC): ICD-10-CM

## 2024-05-02 DIAGNOSIS — F32.A DEPRESSION DURING PREGNANCY IN FIRST TRIMESTER (HHS-HCC): ICD-10-CM

## 2024-05-02 PROCEDURE — 0501F PRENATAL FLOW SHEET: CPT | Performed by: OBSTETRICS & GYNECOLOGY

## 2024-05-02 PROCEDURE — 76819 FETAL BIOPHYS PROFIL W/O NST: CPT | Performed by: MEDICAL GENETICS

## 2024-05-02 PROCEDURE — 76820 UMBILICAL ARTERY ECHO: CPT | Performed by: MEDICAL GENETICS

## 2024-05-02 PROCEDURE — 76820 UMBILICAL ARTERY ECHO: CPT

## 2024-05-02 PROCEDURE — 76819 FETAL BIOPHYS PROFIL W/O NST: CPT

## 2024-05-02 ASSESSMENT — ENCOUNTER SYMPTOMS
LOSS OF SENSATION IN FEET: 0
DEPRESSION: 0
OCCASIONAL FEELINGS OF UNSTEADINESS: 0

## 2024-05-02 NOTE — PROGRESS NOTES
SUBJECTIVE  Meaghan Gaona is a 33 y.o.  at 33w0d with an estimated date of delivery of 2024, by Last Menstrual Period who presents for a routine prenatal visit.    She denies loss of fluid, vaginal bleeding, regular contractions/cramping, and decreased fetal movement. Reviewed fetal kick counts    OBJECTIVE  Vitals:    24 1534   BP: 116/75   Weight: 77.6 kg (171 lb)          ASSESSMENT & PLAN    Supervision of high risk pregnancy, antepartum ([facility])  - Up to date on care    H/O pre-eclampsia in prior pregnancy, currently pregnant ([facility])  - BP great today!     Fetal growth restriction antepartum ([facility])  - Most recent ultrasound with EFW 5%ile. AC 9%ile  - Planning weekly dopplers/BPP with q3wk growths with MFM  - Will scheduled IOL pending growth on     Follow up in 2 weeks for return OB visit.    Edna East MD  Obstetrics & Gynecology  24

## 2024-05-02 NOTE — ASSESSMENT & PLAN NOTE
- Most recent ultrasound with EFW 5%ile. AC 9%ile  - Planning weekly dopplers/BPP with q3wk growths with MFM  - Will scheduled IOL pending growth on 5/8

## 2024-05-08 ENCOUNTER — HOSPITAL ENCOUNTER (OUTPATIENT)
Dept: RADIOLOGY | Facility: HOSPITAL | Age: 34
Discharge: HOME | End: 2024-05-08
Payer: COMMERCIAL

## 2024-05-08 DIAGNOSIS — Z36.4: ICD-10-CM

## 2024-05-08 PROCEDURE — 76819 FETAL BIOPHYS PROFIL W/O NST: CPT | Performed by: OBSTETRICS & GYNECOLOGY

## 2024-05-08 PROCEDURE — 76820 UMBILICAL ARTERY ECHO: CPT | Performed by: OBSTETRICS & GYNECOLOGY

## 2024-05-08 PROCEDURE — 76816 OB US FOLLOW-UP PER FETUS: CPT | Performed by: OBSTETRICS & GYNECOLOGY

## 2024-05-08 PROCEDURE — 76819 FETAL BIOPHYS PROFIL W/O NST: CPT

## 2024-05-08 PROCEDURE — 76820 UMBILICAL ARTERY ECHO: CPT

## 2024-05-08 PROCEDURE — 76821 MIDDLE CEREBRAL ARTERY ECHO: CPT | Performed by: OBSTETRICS & GYNECOLOGY

## 2024-05-08 PROCEDURE — 76816 OB US FOLLOW-UP PER FETUS: CPT

## 2024-05-10 ENCOUNTER — TELEPHONE (OUTPATIENT)
Dept: OBSTETRICS AND GYNECOLOGY | Facility: CLINIC | Age: 34
End: 2024-05-10
Payer: COMMERCIAL

## 2024-05-10 NOTE — TELEPHONE ENCOUNTER
Called patient LVMTCB 5/10/2024 12:03    ----- Message from Meaghan Gaona sent at 5/10/2024 11:42 AM EDT -----  Regarding: Induction and pain question  Contact: [phone]  Hi Dr. East,    As far as the induction goes, any date is fine - whatever works best for you guys.     I also have a question about pain I’ve been having since at least 5am; I don’t know if it’s normal or not.     I’ve been having this upper pelvis/lower abdominal shooting pain - it lasts at least 30 seconds and then goes away for 2-10 minutes before coming back. I’m terrible at eating pain but it was severe this morning and more moderate now.     I have tried resting and have had at least 4 bottles of water.     Sorry for the novel, I guess my question is: is this type of pain normal?     Thanks,   Meaghan

## 2024-05-10 NOTE — TELEPHONE ENCOUNTER
Called patient again 5/10/24 1:56. Patient stated she has some shooting pain/ contractions that start at 5am . Patient stated where irregular and they last anywhere between 30 secs long to 1 minute and every 2-10 minutes. Patient stated the intensity has decreased now. Patient denies having any vaginal bleeding, no loss of fluid. Patient is unsure if there any changes in fetal movement as she has not pay attention to it today. Patient advised to continue to monitor contractions if they start to come regularly and severity increases, vaginal bleeding or gush of fluids coming from the vagina to go to L&D. Patient encourage to stay hydrate and see if changing position helps with pain. Patient stated she would see how things go if she feels like she needs to go to L&D she will go. Patient informed there is a 24 hour nurses line and she can call the office with any questions or concerns.    ----- Message from Meaghan Gaona sent at 5/10/2024 11:42 AM EDT -----  Regarding: Induction and pain question  Contact: [phone]  Hi Dr. East,    As far as the induction goes, any date is fine - whatever works best for you guys.     I also have a question about pain I’ve been having since at least 5am; I don’t know if it’s normal or not.     I’ve been having this upper pelvis/lower abdominal shooting pain - it lasts at least 30 seconds and then goes away for 2-10 minutes before coming back. I’m terrible at eating pain but it was severe this morning and more moderate now.     I have tried resting and have had at least 4 bottles of water.     Sorry for the novel, I guess my question is: is this type of pain normal?     Thanks,   Meaghan

## 2024-05-15 ENCOUNTER — ROUTINE PRENATAL (OUTPATIENT)
Dept: OBSTETRICS AND GYNECOLOGY | Facility: HOSPITAL | Age: 34
End: 2024-05-15
Payer: COMMERCIAL

## 2024-05-15 VITALS — DIASTOLIC BLOOD PRESSURE: 83 MMHG | BODY MASS INDEX: 31.83 KG/M2 | SYSTOLIC BLOOD PRESSURE: 130 MMHG | WEIGHT: 174 LBS

## 2024-05-15 DIAGNOSIS — F32.A DEPRESSION DURING PREGNANCY IN FIRST TRIMESTER (HHS-HCC): ICD-10-CM

## 2024-05-15 DIAGNOSIS — O99.341 DEPRESSION DURING PREGNANCY IN FIRST TRIMESTER (HHS-HCC): ICD-10-CM

## 2024-05-15 DIAGNOSIS — O36.5990 FETAL GROWTH RESTRICTION ANTEPARTUM (HHS-HCC): ICD-10-CM

## 2024-05-15 DIAGNOSIS — O09.90 SUPERVISION OF HIGH RISK PREGNANCY, ANTEPARTUM (HHS-HCC): ICD-10-CM

## 2024-05-15 DIAGNOSIS — O09.299 H/O PRE-ECLAMPSIA IN PRIOR PREGNANCY, CURRENTLY PREGNANT (HHS-HCC): ICD-10-CM

## 2024-05-15 PROCEDURE — 0501F PRENATAL FLOW SHEET: CPT | Performed by: NURSE PRACTITIONER

## 2024-05-15 ASSESSMENT — ENCOUNTER SYMPTOMS
DEPRESSION: 0
LOSS OF SENSATION IN FEET: 0
OCCASIONAL FEELINGS OF UNSTEADINESS: 0

## 2024-05-15 NOTE — PROGRESS NOTES
Subjective   Meaghan Gaona is a 33 y.o.  at 34w6d with a working estimated date of delivery of 2024, by Last Menstrual Period who presents for a routine prenatal visit.     She denies vaginal bleeding, leakage of fluid, decreased fetal movements, or contractions.    Objective   Physical Exam:   Weight: 78.9 kg (174 lb)  TW.2 kg (27 lb)  BP: 130/83  Fetal Heart Rate: 128 Fundal Height (cm): 31 cm           Postpartum Depression: Medium Risk (2023)    [place]  Depression Scale     Last EPDS Total Score: 7     Last EPDS Self Harm Result: Never        Prenatal Labs  Lab Results   Component Value Date    HGB 12.1 2024    HCT 35.9 (L) 2024     2024    SYPHT Nonreactive 2024     Lab Results   Component Value Date    GLUC1P 124 2024     Group B Strep Screen   Date Value Ref Range Status   2023 Group B streptococcus (A)  Final     Comment:     ISOLATED        Imaging  The most recent ultrasound was performed on  with a study GA of 33w6d EFW 9%, AC 18%.     Assessment/Plan   Problem List Items Addressed This Visit       Depression during pregnancy in first trimester ([facility])    Overview     - Well controlled on Zoloft 50 mg  - Declines counseling         Supervision of high risk pregnancy, antepartum ([facility])    Overview     [x] Initial BMI: 26  [x] Dating US: 8 wk US 23  [x] Prenatal Labs:   [x] Pap:   [x] Genetic Screening:    [x] bASA: 12 weeks  [x] Anatomy US: Normal - FGR  [x] 1hr GCT at 24-28wks: 124  [x] Tdap (27-36wks): 4/4  [x] Flu/COVID: Counseled  [x] Rhogam (if Rh neg): 4/4  [] GBS at 36 wks:  [x] Breastfeeding: Yes  [x] Postpartum Birth control method: POP  [] 39 weeks discussion of IOL vs. Expectant management:  [x] Mode of delivery: Anticipate          H/O pre-eclampsia in prior pregnancy, currently pregnant ([facility])    Overview     - Admitted for sPEC and delivered at 31 weeks in prior pregnancy  - MFM consult given  history of FGR and sPEC to determine US timing  - Normotensive at new OB, no signs of chronic HTN  [ ] growth US Q 4 weeks starting at 24 weeks           Fetal growth restriction antepartum ([facility])       Reviewed s/sx of PTL, warning signs, fetal movement counts, and when to call provider  Follow up in 2 week for a routine prenatal visit.    Libby Mendez, APRN-CNP

## 2024-05-16 ENCOUNTER — HOSPITAL ENCOUNTER (OUTPATIENT)
Dept: RADIOLOGY | Facility: HOSPITAL | Age: 34
Discharge: HOME | End: 2024-05-16
Payer: COMMERCIAL

## 2024-05-16 DIAGNOSIS — Z36.4: ICD-10-CM

## 2024-05-16 PROCEDURE — 76819 FETAL BIOPHYS PROFIL W/O NST: CPT | Performed by: MEDICAL GENETICS

## 2024-05-16 PROCEDURE — 76819 FETAL BIOPHYS PROFIL W/O NST: CPT

## 2024-05-16 PROCEDURE — 76820 UMBILICAL ARTERY ECHO: CPT

## 2024-05-16 PROCEDURE — 76820 UMBILICAL ARTERY ECHO: CPT | Performed by: MEDICAL GENETICS

## 2024-05-21 ENCOUNTER — HOSPITAL ENCOUNTER (OUTPATIENT)
Dept: RADIOLOGY | Facility: HOSPITAL | Age: 34
Discharge: HOME | End: 2024-05-21
Payer: COMMERCIAL

## 2024-05-21 DIAGNOSIS — Z36.4: ICD-10-CM

## 2024-05-21 PROCEDURE — 76819 FETAL BIOPHYS PROFIL W/O NST: CPT

## 2024-05-21 PROCEDURE — 76819 FETAL BIOPHYS PROFIL W/O NST: CPT | Performed by: OBSTETRICS & GYNECOLOGY

## 2024-05-21 PROCEDURE — 76820 UMBILICAL ARTERY ECHO: CPT

## 2024-05-21 PROCEDURE — 76821 MIDDLE CEREBRAL ARTERY ECHO: CPT | Performed by: OBSTETRICS & GYNECOLOGY

## 2024-05-21 PROCEDURE — 76820 UMBILICAL ARTERY ECHO: CPT | Performed by: OBSTETRICS & GYNECOLOGY

## 2024-05-22 ENCOUNTER — ROUTINE PRENATAL (OUTPATIENT)
Dept: OBSTETRICS AND GYNECOLOGY | Facility: CLINIC | Age: 34
End: 2024-05-22
Payer: COMMERCIAL

## 2024-05-22 VITALS — BODY MASS INDEX: 32.19 KG/M2 | DIASTOLIC BLOOD PRESSURE: 74 MMHG | WEIGHT: 176 LBS | SYSTOLIC BLOOD PRESSURE: 118 MMHG

## 2024-05-22 DIAGNOSIS — O09.90 SUPERVISION OF HIGH RISK PREGNANCY, ANTEPARTUM (HHS-HCC): ICD-10-CM

## 2024-05-22 DIAGNOSIS — O09.299 H/O PRE-ECLAMPSIA IN PRIOR PREGNANCY, CURRENTLY PREGNANT (HHS-HCC): ICD-10-CM

## 2024-05-22 DIAGNOSIS — O99.341 DEPRESSION DURING PREGNANCY IN FIRST TRIMESTER (HHS-HCC): ICD-10-CM

## 2024-05-22 DIAGNOSIS — O36.5990 FETAL GROWTH RESTRICTION ANTEPARTUM (HHS-HCC): ICD-10-CM

## 2024-05-22 DIAGNOSIS — F32.A DEPRESSION DURING PREGNANCY IN FIRST TRIMESTER (HHS-HCC): ICD-10-CM

## 2024-05-22 PROBLEM — O32.9XX0: Status: ACTIVE | Noted: 2024-05-22

## 2024-05-22 PROCEDURE — 0501F PRENATAL FLOW SHEET: CPT | Performed by: OBSTETRICS & GYNECOLOGY

## 2024-05-22 PROCEDURE — 87081 CULTURE SCREEN ONLY: CPT

## 2024-05-22 NOTE — PROGRESS NOTES
SUBJECTIVE  Meaghan Gaona is a 33 y.o.  at 35w6d with an estimated date of delivery of 2024, by Last Menstrual Period who presents for a routine prenatal visit.    She denies loss of fluid, vaginal bleeding, regular contractions/cramping, and decreased fetal movement.     OBJECTIVE  Vitals:    24 1442   BP: 118/74   Weight: 79.8 kg (176 lb)          ASSESSMENT & PLAN    Supervision of high risk pregnancy, antepartum ([facility])  - GBS today    H/O pre-eclampsia in prior pregnancy, currently pregnant ([facility])  - Normotensive    Fetal malpresentation ([facility])  Reviewed recommendation for either ECV or scheduled pCS if fetal malpresentation persists. Discussed ECV procedure at length. Additionally, discussed that neuraxial anesthesia would allow us to proceed directly with pCS should complications arise. Discussed <1% risk of placental abruption, ROM, umbilical cord prolapse, stillbirth, and fetal maternal hemorrhage. Reviewed that fetal heart rate changes are common during the procedure but are typically transient and improve after the procedure is terminated. Finally, discussed that while women who undergo ECV have an overall lower rate of  delivery than women with malpresentation who do not undergo ECV, women who have a successful ECV still have a higher rate of  delivery than women who have spontaneous vertex presentations.    Specifically we reviewed that since her baby has FGR, has a higher chance of not tolerating the ECV and labor.    She is undecided and would like to speak with her partner. She will reach out to me when she decides what to schedule.    Follow up in 1 week for return OB visit.    Edna East MD  Obstetrics & Gynecology  24

## 2024-05-22 NOTE — ASSESSMENT & PLAN NOTE
Reviewed recommendation for either ECV or scheduled pCS if fetal malpresentation persists. Discussed ECV procedure at length. Additionally, discussed that neuraxial anesthesia would allow us to proceed directly with pCS should complications arise. Discussed <1% risk of placental abruption, ROM, umbilical cord prolapse, stillbirth, and fetal maternal hemorrhage. Reviewed that fetal heart rate changes are common during the procedure but are typically transient and improve after the procedure is terminated. Finally, discussed that while women who undergo ECV have an overall lower rate of  delivery than women with malpresentation who do not undergo ECV, women who have a successful ECV still have a higher rate of  delivery than women who have spontaneous vertex presentations.    Specifically we reviewed that since her baby has FGR, has a higher chance of not tolerating the ECV and labor.    She is undecided and would like to speak with her partner. She will reach out to me when she decides what to schedule.

## 2024-05-23 ENCOUNTER — PATIENT MESSAGE (OUTPATIENT)
Dept: OBSTETRICS AND GYNECOLOGY | Facility: HOSPITAL | Age: 34
End: 2024-05-23
Payer: COMMERCIAL

## 2024-05-24 ENCOUNTER — HOSPITAL ENCOUNTER (OUTPATIENT)
Facility: HOSPITAL | Age: 34
Setting detail: SURGERY ADMIT
End: 2024-05-24
Attending: OBSTETRICS & GYNECOLOGY | Admitting: OBSTETRICS & GYNECOLOGY
Payer: COMMERCIAL

## 2024-05-25 LAB — GP B STREP GENITAL QL CULT: ABNORMAL

## 2024-05-28 ENCOUNTER — LAB (OUTPATIENT)
Dept: LAB | Facility: LAB | Age: 34
End: 2024-05-28
Payer: COMMERCIAL

## 2024-05-28 ENCOUNTER — HOSPITAL ENCOUNTER (OUTPATIENT)
Dept: RADIOLOGY | Facility: HOSPITAL | Age: 34
Discharge: HOME | End: 2024-05-28
Payer: COMMERCIAL

## 2024-05-28 DIAGNOSIS — Z36.4: ICD-10-CM

## 2024-05-28 LAB
ABO GROUP (TYPE) IN BLOOD: NORMAL
ANTIBODY SCREEN: NORMAL
BASOPHILS # BLD AUTO: 0.04 X10*3/UL (ref 0–0.1)
BASOPHILS NFR BLD AUTO: 0.4 %
EOSINOPHIL # BLD AUTO: 0.06 X10*3/UL (ref 0–0.7)
EOSINOPHIL NFR BLD AUTO: 0.6 %
ERYTHROCYTE [DISTWIDTH] IN BLOOD BY AUTOMATED COUNT: 13.6 % (ref 11.5–14.5)
HCT VFR BLD AUTO: 34.7 % (ref 36–46)
HGB BLD-MCNC: 11.4 G/DL (ref 12–16)
IMM GRANULOCYTES # BLD AUTO: 0.07 X10*3/UL (ref 0–0.7)
IMM GRANULOCYTES NFR BLD AUTO: 0.6 % (ref 0–0.9)
LYMPHOCYTES # BLD AUTO: 1.57 X10*3/UL (ref 1.2–4.8)
LYMPHOCYTES NFR BLD AUTO: 14.4 %
MCH RBC QN AUTO: 28.1 PG (ref 26–34)
MCHC RBC AUTO-ENTMCNC: 32.9 G/DL (ref 32–36)
MCV RBC AUTO: 86 FL (ref 80–100)
MONOCYTES # BLD AUTO: 0.82 X10*3/UL (ref 0.1–1)
MONOCYTES NFR BLD AUTO: 7.5 %
NEUTROPHILS # BLD AUTO: 8.34 X10*3/UL (ref 1.2–7.7)
NEUTROPHILS NFR BLD AUTO: 76.5 %
NRBC BLD-RTO: 0 /100 WBCS (ref 0–0)
PLATELET # BLD AUTO: 242 X10*3/UL (ref 150–450)
RBC # BLD AUTO: 4.05 X10*6/UL (ref 4–5.2)
RH FACTOR (ANTIGEN D): NORMAL
WBC # BLD AUTO: 10.9 X10*3/UL (ref 4.4–11.3)

## 2024-05-28 PROCEDURE — 76816 OB US FOLLOW-UP PER FETUS: CPT

## 2024-05-28 PROCEDURE — 76816 OB US FOLLOW-UP PER FETUS: CPT | Performed by: OBSTETRICS & GYNECOLOGY

## 2024-05-28 PROCEDURE — 76820 UMBILICAL ARTERY ECHO: CPT | Performed by: OBSTETRICS & GYNECOLOGY

## 2024-05-28 PROCEDURE — 76819 FETAL BIOPHYS PROFIL W/O NST: CPT

## 2024-05-28 PROCEDURE — 76819 FETAL BIOPHYS PROFIL W/O NST: CPT | Performed by: OBSTETRICS & GYNECOLOGY

## 2024-05-28 PROCEDURE — 86850 RBC ANTIBODY SCREEN: CPT

## 2024-05-28 PROCEDURE — 86901 BLOOD TYPING SEROLOGIC RH(D): CPT

## 2024-05-28 PROCEDURE — 36415 COLL VENOUS BLD VENIPUNCTURE: CPT

## 2024-05-28 PROCEDURE — 76820 UMBILICAL ARTERY ECHO: CPT

## 2024-05-28 PROCEDURE — 86900 BLOOD TYPING SEROLOGIC ABO: CPT

## 2024-05-28 PROCEDURE — 85025 COMPLETE CBC W/AUTO DIFF WBC: CPT

## 2024-05-28 PROCEDURE — 76821 MIDDLE CEREBRAL ARTERY ECHO: CPT | Performed by: OBSTETRICS & GYNECOLOGY

## 2024-05-29 LAB
BB ANTIBODY IDENTIFICATION: NORMAL
CASE #: NORMAL
PATH REV-IMMUNOHEMATOLOGY-PR30: NORMAL

## 2024-05-30 ENCOUNTER — ANESTHESIA EVENT (OUTPATIENT)
Dept: OBSTETRICS AND GYNECOLOGY | Facility: HOSPITAL | Age: 34
End: 2024-05-30
Payer: COMMERCIAL

## 2024-05-30 ENCOUNTER — HOSPITAL ENCOUNTER (INPATIENT)
Facility: HOSPITAL | Age: 34
LOS: 4 days | Discharge: HOME | End: 2024-06-03
Attending: OBSTETRICS & GYNECOLOGY | Admitting: NURSE PRACTITIONER
Payer: COMMERCIAL

## 2024-05-30 ENCOUNTER — ANESTHESIA (OUTPATIENT)
Dept: OBSTETRICS AND GYNECOLOGY | Facility: HOSPITAL | Age: 34
End: 2024-05-30
Payer: COMMERCIAL

## 2024-05-30 ENCOUNTER — ROUTINE PRENATAL (OUTPATIENT)
Dept: OBSTETRICS AND GYNECOLOGY | Facility: HOSPITAL | Age: 34
End: 2024-05-30
Payer: COMMERCIAL

## 2024-05-30 VITALS — SYSTOLIC BLOOD PRESSURE: 141 MMHG | DIASTOLIC BLOOD PRESSURE: 89 MMHG | WEIGHT: 177 LBS | BODY MASS INDEX: 32.37 KG/M2

## 2024-05-30 DIAGNOSIS — O13.9 GESTATIONAL HYPERTENSION, UNSPECIFIED TRIMESTER (HHS-HCC): ICD-10-CM

## 2024-05-30 DIAGNOSIS — O09.90 SUPERVISION OF HIGH RISK PREGNANCY, ANTEPARTUM (HHS-HCC): ICD-10-CM

## 2024-05-30 DIAGNOSIS — O36.5990 FETAL GROWTH RESTRICTION ANTEPARTUM (HHS-HCC): ICD-10-CM

## 2024-05-30 DIAGNOSIS — R03.0 ELEVATED BP WITHOUT DIAGNOSIS OF HYPERTENSION: Primary | ICD-10-CM

## 2024-05-30 DIAGNOSIS — O09.299 H/O PRE-ECLAMPSIA IN PRIOR PREGNANCY, CURRENTLY PREGNANT (HHS-HCC): ICD-10-CM

## 2024-05-30 DIAGNOSIS — Z30.011 ENCOUNTER FOR INITIAL PRESCRIPTION OF CONTRACEPTIVE PILLS: ICD-10-CM

## 2024-05-30 DIAGNOSIS — O99.341 DEPRESSION DURING PREGNANCY IN FIRST TRIMESTER (HHS-HCC): ICD-10-CM

## 2024-05-30 DIAGNOSIS — F32.A DEPRESSION DURING PREGNANCY IN FIRST TRIMESTER (HHS-HCC): ICD-10-CM

## 2024-05-30 PROBLEM — O16.3 ELEVATED BLOOD PRESSURE AFFECTING PREGNANCY IN THIRD TRIMESTER, ANTEPARTUM (HHS-HCC): Status: ACTIVE | Noted: 2024-05-30

## 2024-05-30 PROBLEM — I10 HTN (HYPERTENSION): Status: ACTIVE | Noted: 2024-05-30

## 2024-05-30 PROBLEM — K80.20 CHOLELITHIASIS: Status: ACTIVE | Noted: 2024-05-30

## 2024-05-30 LAB
ABO GROUP (TYPE) IN BLOOD: NORMAL
ALBUMIN SERPL BCP-MCNC: 3.4 G/DL (ref 3.4–5)
ALP SERPL-CCNC: 206 U/L (ref 33–110)
ALT SERPL W P-5'-P-CCNC: 11 U/L (ref 7–45)
ANION GAP SERPL CALC-SCNC: 15 MMOL/L (ref 10–20)
ANTIBODY SCREEN: NORMAL
AST SERPL W P-5'-P-CCNC: 17 U/L (ref 9–39)
BILIRUB SERPL-MCNC: 0.4 MG/DL (ref 0–1.2)
BUN SERPL-MCNC: 9 MG/DL (ref 6–23)
CALCIUM SERPL-MCNC: 8.4 MG/DL (ref 8.6–10.6)
CHLORIDE SERPL-SCNC: 100 MMOL/L (ref 98–107)
CO2 SERPL-SCNC: 22 MMOL/L (ref 21–32)
CREAT SERPL-MCNC: 0.66 MG/DL (ref 0.5–1.05)
CREAT UR-MCNC: 59.2 MG/DL (ref 20–320)
EGFRCR SERPLBLD CKD-EPI 2021: >90 ML/MIN/1.73M*2
ERYTHROCYTE [DISTWIDTH] IN BLOOD BY AUTOMATED COUNT: 13.3 % (ref 11.5–14.5)
GLUCOSE BLD MANUAL STRIP-MCNC: 56 MG/DL (ref 74–99)
GLUCOSE BLD MANUAL STRIP-MCNC: 67 MG/DL (ref 74–99)
GLUCOSE SERPL-MCNC: 48 MG/DL (ref 74–99)
HCT VFR BLD AUTO: 35.1 % (ref 36–46)
HGB BLD-MCNC: 11.7 G/DL (ref 12–16)
LDH SERPL L TO P-CCNC: 209 U/L (ref 84–246)
MCH RBC QN AUTO: 28.3 PG (ref 26–34)
MCHC RBC AUTO-ENTMCNC: 33.3 G/DL (ref 32–36)
MCV RBC AUTO: 85 FL (ref 80–100)
NRBC BLD-RTO: 0 /100 WBCS (ref 0–0)
PLATELET # BLD AUTO: 215 X10*3/UL (ref 150–450)
POC APPEARANCE, URINE: CLEAR
POC BILIRUBIN, URINE: NEGATIVE
POC BLOOD, URINE: NEGATIVE
POC COLOR, URINE: YELLOW
POC GLUCOSE, URINE: NEGATIVE MG/DL
POC KETONES, URINE: NEGATIVE MG/DL
POC LEUKOCYTES, URINE: ABNORMAL
POC NITRITE,URINE: NEGATIVE
POC PH, URINE: 7 PH
POC PROTEIN, URINE: NEGATIVE MG/DL
POC SPECIFIC GRAVITY, URINE: 1.01
POC UROBILINOGEN, URINE: 0.2 EU/DL
POTASSIUM SERPL-SCNC: 3.8 MMOL/L (ref 3.5–5.3)
PROT SERPL-MCNC: 6.5 G/DL (ref 6.4–8.2)
PROT UR-ACNC: 10 MG/DL (ref 5–24)
PROT/CREAT UR: 0.17 MG/MG CREAT (ref 0–0.17)
RBC # BLD AUTO: 4.13 X10*6/UL (ref 4–5.2)
RH FACTOR (ANTIGEN D): NORMAL
SODIUM SERPL-SCNC: 133 MMOL/L (ref 136–145)
TREPONEMA PALLIDUM IGG+IGM AB [PRESENCE] IN SERUM OR PLASMA BY IMMUNOASSAY: NONREACTIVE
WBC # BLD AUTO: 9 X10*3/UL (ref 4.4–11.3)

## 2024-05-30 PROCEDURE — 2500000005 HC RX 250 GENERAL PHARMACY W/O HCPCS: Performed by: ANESTHESIOLOGIST ASSISTANT

## 2024-05-30 PROCEDURE — 10907ZC DRAINAGE OF AMNIOTIC FLUID, THERAPEUTIC FROM PRODUCTS OF CONCEPTION, VIA NATURAL OR ARTIFICIAL OPENING: ICD-10-PCS | Performed by: OBSTETRICS & GYNECOLOGY

## 2024-05-30 PROCEDURE — 0501F PRENATAL FLOW SHEET: CPT | Performed by: OBSTETRICS & GYNECOLOGY

## 2024-05-30 PROCEDURE — 84075 ASSAY ALKALINE PHOSPHATASE: CPT | Performed by: NURSE PRACTITIONER

## 2024-05-30 PROCEDURE — 86870 RBC ANTIBODY IDENTIFICATION: CPT

## 2024-05-30 PROCEDURE — 59412 ANTEPARTUM MANIPULATION: CPT | Performed by: OBSTETRICS & GYNECOLOGY

## 2024-05-30 PROCEDURE — 86780 TREPONEMA PALLIDUM: CPT | Performed by: NURSE PRACTITIONER

## 2024-05-30 PROCEDURE — 81002 URINALYSIS NONAUTO W/O SCOPE: CPT | Performed by: NURSE PRACTITIONER

## 2024-05-30 PROCEDURE — 2500000004 HC RX 250 GENERAL PHARMACY W/ HCPCS (ALT 636 FOR OP/ED): Performed by: NURSE PRACTITIONER

## 2024-05-30 PROCEDURE — 82947 ASSAY GLUCOSE BLOOD QUANT: CPT

## 2024-05-30 PROCEDURE — 86901 BLOOD TYPING SEROLOGIC RH(D): CPT | Performed by: NURSE PRACTITIONER

## 2024-05-30 PROCEDURE — 10S0XZZ REPOSITION PRODUCTS OF CONCEPTION, EXTERNAL APPROACH: ICD-10-PCS | Performed by: OBSTETRICS & GYNECOLOGY

## 2024-05-30 PROCEDURE — 36415 COLL VENOUS BLD VENIPUNCTURE: CPT | Performed by: NURSE PRACTITIONER

## 2024-05-30 PROCEDURE — 82570 ASSAY OF URINE CREATININE: CPT | Performed by: NURSE PRACTITIONER

## 2024-05-30 PROCEDURE — 83615 LACTATE (LD) (LDH) ENZYME: CPT | Performed by: NURSE PRACTITIONER

## 2024-05-30 PROCEDURE — 1120000001 HC OB PRIVATE ROOM DAILY

## 2024-05-30 PROCEDURE — 85027 COMPLETE CBC AUTOMATED: CPT | Performed by: NURSE PRACTITIONER

## 2024-05-30 PROCEDURE — 86922 COMPATIBILITY TEST ANTIGLOB: CPT

## 2024-05-30 PROCEDURE — 2500000004 HC RX 250 GENERAL PHARMACY W/ HCPCS (ALT 636 FOR OP/ED)

## 2024-05-30 PROCEDURE — 2500000004 HC RX 250 GENERAL PHARMACY W/ HCPCS (ALT 636 FOR OP/ED): Performed by: ANESTHESIOLOGIST ASSISTANT

## 2024-05-30 PROCEDURE — 3700000014 HC AN EPIDURAL BLOCK CHARGE: Performed by: OBSTETRICS & GYNECOLOGY

## 2024-05-30 PROCEDURE — 2580000001 HC RX 258 IV SOLUTIONS: Performed by: NURSE PRACTITIONER

## 2024-05-30 PROCEDURE — 7210000002 HC LABOR PER HOUR

## 2024-05-30 RX ORDER — LABETALOL HYDROCHLORIDE 5 MG/ML
20 INJECTION, SOLUTION INTRAVENOUS ONCE AS NEEDED
Status: DISCONTINUED | OUTPATIENT
Start: 2024-05-30 | End: 2024-05-30

## 2024-05-30 RX ORDER — ONDANSETRON HYDROCHLORIDE 2 MG/ML
4 INJECTION, SOLUTION INTRAVENOUS EVERY 6 HOURS PRN
Status: DISCONTINUED | OUTPATIENT
Start: 2024-05-30 | End: 2024-05-31

## 2024-05-30 RX ORDER — HYDRALAZINE HYDROCHLORIDE 20 MG/ML
5 INJECTION INTRAMUSCULAR; INTRAVENOUS ONCE AS NEEDED
Status: DISCONTINUED | OUTPATIENT
Start: 2024-05-30 | End: 2024-05-31 | Stop reason: HOSPADM

## 2024-05-30 RX ORDER — NIFEDIPINE 10 MG/1
10 CAPSULE ORAL ONCE AS NEEDED
Status: DISCONTINUED | OUTPATIENT
Start: 2024-05-30 | End: 2024-05-31 | Stop reason: HOSPADM

## 2024-05-30 RX ORDER — SODIUM CHLORIDE, SODIUM LACTATE, POTASSIUM CHLORIDE, CALCIUM CHLORIDE 600; 310; 30; 20 MG/100ML; MG/100ML; MG/100ML; MG/100ML
125 INJECTION, SOLUTION INTRAVENOUS CONTINUOUS
Status: DISCONTINUED | OUTPATIENT
Start: 2024-05-30 | End: 2024-05-31

## 2024-05-30 RX ORDER — HYDRALAZINE HYDROCHLORIDE 20 MG/ML
5 INJECTION INTRAMUSCULAR; INTRAVENOUS ONCE AS NEEDED
Status: DISCONTINUED | OUTPATIENT
Start: 2024-05-30 | End: 2024-05-30

## 2024-05-30 RX ORDER — ONDANSETRON HYDROCHLORIDE 2 MG/ML
4 INJECTION, SOLUTION INTRAVENOUS EVERY 6 HOURS PRN
Status: DISCONTINUED | OUTPATIENT
Start: 2024-05-30 | End: 2024-05-30

## 2024-05-30 RX ORDER — OXYTOCIN 10 [USP'U]/ML
10 INJECTION, SOLUTION INTRAMUSCULAR; INTRAVENOUS ONCE AS NEEDED
Status: DISCONTINUED | OUTPATIENT
Start: 2024-05-30 | End: 2024-05-31 | Stop reason: HOSPADM

## 2024-05-30 RX ORDER — METOCLOPRAMIDE 10 MG/1
10 TABLET ORAL EVERY 6 HOURS PRN
Status: DISCONTINUED | OUTPATIENT
Start: 2024-05-30 | End: 2024-05-31

## 2024-05-30 RX ORDER — SERTRALINE HYDROCHLORIDE 50 MG/1
50 TABLET, FILM COATED ORAL DAILY
Status: DISCONTINUED | OUTPATIENT
Start: 2024-05-31 | End: 2024-06-03 | Stop reason: HOSPADM

## 2024-05-30 RX ORDER — LOPERAMIDE HYDROCHLORIDE 2 MG/1
4 CAPSULE ORAL EVERY 2 HOUR PRN
Status: DISCONTINUED | OUTPATIENT
Start: 2024-05-30 | End: 2024-05-31 | Stop reason: HOSPADM

## 2024-05-30 RX ORDER — ONDANSETRON 4 MG/1
4 TABLET, FILM COATED ORAL EVERY 6 HOURS PRN
Status: DISCONTINUED | OUTPATIENT
Start: 2024-05-30 | End: 2024-05-30

## 2024-05-30 RX ORDER — SODIUM CHLORIDE, SODIUM LACTATE, POTASSIUM CHLORIDE, CALCIUM CHLORIDE 600; 310; 30; 20 MG/100ML; MG/100ML; MG/100ML; MG/100ML
INJECTION, SOLUTION INTRAVENOUS CONTINUOUS PRN
Status: DISCONTINUED | OUTPATIENT
Start: 2024-05-30 | End: 2024-05-30

## 2024-05-30 RX ORDER — PHENYLEPHRINE 10 MG/250 ML(40 MCG/ML)IN 0.9 % SOD.CHLORIDE INTRAVENOUS
CONTINUOUS PRN
Status: DISCONTINUED | OUTPATIENT
Start: 2024-05-30 | End: 2024-05-30

## 2024-05-30 RX ORDER — PENICILLIN G 3000000 [IU]/50ML
3 INJECTION, SOLUTION INTRAVENOUS EVERY 4 HOURS
Status: DISCONTINUED | OUTPATIENT
Start: 2024-05-31 | End: 2024-05-31

## 2024-05-30 RX ORDER — FAMOTIDINE 10 MG/ML
INJECTION INTRAVENOUS AS NEEDED
Status: DISCONTINUED | OUTPATIENT
Start: 2024-05-30 | End: 2024-05-30

## 2024-05-30 RX ORDER — LABETALOL HYDROCHLORIDE 5 MG/ML
20 INJECTION, SOLUTION INTRAVENOUS ONCE AS NEEDED
Status: DISCONTINUED | OUTPATIENT
Start: 2024-05-30 | End: 2024-05-31 | Stop reason: HOSPADM

## 2024-05-30 RX ORDER — OXYTOCIN/0.9 % SODIUM CHLORIDE 30/500 ML
2-30 PLASTIC BAG, INJECTION (ML) INTRAVENOUS CONTINUOUS
Status: DISCONTINUED | OUTPATIENT
Start: 2024-05-30 | End: 2024-05-31

## 2024-05-30 RX ORDER — BUPIVACAINE HYDROCHLORIDE 7.5 MG/ML
INJECTION, SOLUTION INTRASPINAL AS NEEDED
Status: DISCONTINUED | OUTPATIENT
Start: 2024-05-30 | End: 2024-05-30

## 2024-05-30 RX ORDER — METHYLERGONOVINE MALEATE 0.2 MG/ML
0.2 INJECTION INTRAVENOUS ONCE AS NEEDED
Status: DISCONTINUED | OUTPATIENT
Start: 2024-05-30 | End: 2024-05-31 | Stop reason: HOSPADM

## 2024-05-30 RX ORDER — CARBOPROST TROMETHAMINE 250 UG/ML
250 INJECTION, SOLUTION INTRAMUSCULAR ONCE AS NEEDED
Status: DISCONTINUED | OUTPATIENT
Start: 2024-05-30 | End: 2024-05-31 | Stop reason: HOSPADM

## 2024-05-30 RX ORDER — METOCLOPRAMIDE HYDROCHLORIDE 5 MG/ML
10 INJECTION INTRAMUSCULAR; INTRAVENOUS EVERY 6 HOURS PRN
Status: DISCONTINUED | OUTPATIENT
Start: 2024-05-30 | End: 2024-05-31

## 2024-05-30 RX ORDER — NIFEDIPINE 10 MG/1
10 CAPSULE ORAL ONCE AS NEEDED
Status: DISCONTINUED | OUTPATIENT
Start: 2024-05-30 | End: 2024-05-30

## 2024-05-30 RX ORDER — LIDOCAINE HYDROCHLORIDE 10 MG/ML
30 INJECTION INFILTRATION; PERINEURAL ONCE AS NEEDED
Status: DISCONTINUED | OUTPATIENT
Start: 2024-05-30 | End: 2024-05-31 | Stop reason: HOSPADM

## 2024-05-30 RX ORDER — TRANEXAMIC ACID 100 MG/ML
1000 INJECTION, SOLUTION INTRAVENOUS ONCE AS NEEDED
Status: DISCONTINUED | OUTPATIENT
Start: 2024-05-30 | End: 2024-05-31 | Stop reason: HOSPADM

## 2024-05-30 RX ORDER — ONDANSETRON 4 MG/1
4 TABLET, FILM COATED ORAL EVERY 6 HOURS PRN
Status: DISCONTINUED | OUTPATIENT
Start: 2024-05-30 | End: 2024-05-31

## 2024-05-30 RX ORDER — MISOPROSTOL 200 UG/1
800 TABLET ORAL ONCE AS NEEDED
Status: DISCONTINUED | OUTPATIENT
Start: 2024-05-30 | End: 2024-05-31 | Stop reason: HOSPADM

## 2024-05-30 RX ORDER — OXYTOCIN/0.9 % SODIUM CHLORIDE 30/500 ML
60 PLASTIC BAG, INJECTION (ML) INTRAVENOUS ONCE AS NEEDED
Status: DISCONTINUED | OUTPATIENT
Start: 2024-05-30 | End: 2024-05-31 | Stop reason: HOSPADM

## 2024-05-30 RX ORDER — TERBUTALINE SULFATE 1 MG/ML
0.25 INJECTION SUBCUTANEOUS ONCE AS NEEDED
Status: COMPLETED | OUTPATIENT
Start: 2024-05-30 | End: 2024-05-30

## 2024-05-30 RX ADMIN — SODIUM CHLORIDE, POTASSIUM CHLORIDE, SODIUM LACTATE AND CALCIUM CHLORIDE: 600; 310; 30; 20 INJECTION, SOLUTION INTRAVENOUS at 20:12

## 2024-05-30 RX ADMIN — Medication 0.62 MCG/KG/MIN: at 20:07

## 2024-05-30 RX ADMIN — PENICILLIN G POTASSIUM 5 MILLION UNITS: 5000000 INJECTION, POWDER, FOR SOLUTION INTRAMUSCULAR; INTRAVENOUS at 21:21

## 2024-05-30 RX ADMIN — ONDANSETRON 4 MG: 2 INJECTION, SOLUTION INTRAMUSCULAR; INTRAVENOUS at 20:13

## 2024-05-30 RX ADMIN — TERBUTALINE SULFATE 0.25 MG: 1 INJECTION SUBCUTANEOUS at 20:15

## 2024-05-30 RX ADMIN — Medication 2 MILLI-UNITS/MIN: at 22:29

## 2024-05-30 RX ADMIN — FAMOTIDINE 20 MG: 10 INJECTION, SOLUTION INTRAVENOUS at 20:13

## 2024-05-30 RX ADMIN — SODIUM CHLORIDE, SODIUM LACTATE, POTASSIUM CHLORIDE, CALCIUM CHLORIDE AND DEXTROSE MONOHYDRATE 500 ML: 5; 600; 310; 30; 20 INJECTION, SOLUTION INTRAVENOUS at 17:58

## 2024-05-30 RX ADMIN — BUPIVACAINE HYDROCHLORIDE IN DEXTROSE 1.6 ML: 7.5 INJECTION, SOLUTION SUBARACHNOID at 20:07

## 2024-05-30 RX ADMIN — SODIUM CHLORIDE, POTASSIUM CHLORIDE, SODIUM LACTATE AND CALCIUM CHLORIDE 125 ML/HR: 600; 310; 30; 20 INJECTION, SOLUTION INTRAVENOUS at 18:00

## 2024-05-30 SDOH — HEALTH STABILITY: MENTAL HEALTH: WISH TO BE DEAD (PAST 1 MONTH): NO

## 2024-05-30 SDOH — SOCIAL STABILITY: SOCIAL INSECURITY: DO YOU FEEL ANYONE HAS EXPLOITED OR TAKEN ADVANTAGE OF YOU FINANCIALLY OR OF YOUR PERSONAL PROPERTY?: NO

## 2024-05-30 SDOH — HEALTH STABILITY: MENTAL HEALTH: HAVE YOU USED ANY SUBSTANCES (CANABIS, COCAINE, HEROIN, HALLUCINOGENS, INHALANTS, ETC.) IN THE PAST 12 MONTHS?: NO

## 2024-05-30 SDOH — HEALTH STABILITY: MENTAL HEALTH: CURRENT SMOKER: 0

## 2024-05-30 SDOH — ECONOMIC STABILITY: FOOD INSECURITY: WITHIN THE PAST 12 MONTHS, YOU WORRIED THAT YOUR FOOD WOULD RUN OUT BEFORE YOU GOT MONEY TO BUY MORE.: NEVER TRUE

## 2024-05-30 SDOH — HEALTH STABILITY: MENTAL HEALTH: NON-SPECIFIC ACTIVE SUICIDAL THOUGHTS (PAST 1 MONTH): NO

## 2024-05-30 SDOH — SOCIAL STABILITY: SOCIAL INSECURITY: PHYSICAL ABUSE: DENIES

## 2024-05-30 SDOH — HEALTH STABILITY: MENTAL HEALTH: SUICIDAL BEHAVIOR (LIFETIME): NO

## 2024-05-30 SDOH — SOCIAL STABILITY: SOCIAL INSECURITY: HAVE YOU HAD ANY THOUGHTS OF HARMING ANYONE ELSE?: NO

## 2024-05-30 SDOH — HEALTH STABILITY: MENTAL HEALTH: HAVE YOU USED ANY PRESCRIPTION DRUGS OTHER THAN PRESCRIBED IN THE PAST 12 MONTHS?: NO

## 2024-05-30 SDOH — SOCIAL STABILITY: SOCIAL INSECURITY: ABUSE SCREEN: ADULT

## 2024-05-30 SDOH — SOCIAL STABILITY: SOCIAL INSECURITY: VERBAL ABUSE: DENIES

## 2024-05-30 SDOH — ECONOMIC STABILITY: FOOD INSECURITY: WITHIN THE PAST 12 MONTHS, THE FOOD YOU BOUGHT JUST DIDN'T LAST AND YOU DIDN'T HAVE MONEY TO GET MORE.: NEVER TRUE

## 2024-05-30 SDOH — ECONOMIC STABILITY: HOUSING INSECURITY: DO YOU FEEL UNSAFE GOING BACK TO THE PLACE WHERE YOU ARE LIVING?: NO

## 2024-05-30 SDOH — SOCIAL STABILITY: SOCIAL INSECURITY: DOES ANYONE TRY TO KEEP YOU FROM HAVING/CONTACTING OTHER FRIENDS OR DOING THINGS OUTSIDE YOUR HOME?: NO

## 2024-05-30 SDOH — SOCIAL STABILITY: SOCIAL INSECURITY: HAVE YOU HAD THOUGHTS OF HARMING ANYONE ELSE?: NO

## 2024-05-30 SDOH — HEALTH STABILITY: MENTAL HEALTH: WERE YOU ABLE TO COMPLETE ALL THE BEHAVIORAL HEALTH SCREENINGS?: YES

## 2024-05-30 SDOH — SOCIAL STABILITY: SOCIAL INSECURITY: ARE YOU OR HAVE YOU BEEN THREATENED OR ABUSED PHYSICALLY, EMOTIONALLY, OR SEXUALLY BY ANYONE?: NO

## 2024-05-30 SDOH — SOCIAL STABILITY: SOCIAL INSECURITY: ARE THERE ANY APPARENT SIGNS OF INJURIES/BEHAVIORS THAT COULD BE RELATED TO ABUSE/NEGLECT?: NO

## 2024-05-30 SDOH — SOCIAL STABILITY: SOCIAL INSECURITY: HAS ANYONE EVER THREATENED TO HURT YOUR FAMILY OR YOUR PETS?: NO

## 2024-05-30 ASSESSMENT — PATIENT HEALTH QUESTIONNAIRE - PHQ9
2. FEELING DOWN, DEPRESSED OR HOPELESS: NOT AT ALL
SUM OF ALL RESPONSES TO PHQ9 QUESTIONS 1 & 2: 0
1. LITTLE INTEREST OR PLEASURE IN DOING THINGS: NOT AT ALL

## 2024-05-30 ASSESSMENT — ENCOUNTER SYMPTOMS
LOSS OF SENSATION IN FEET: 0
DEPRESSION: 0
OCCASIONAL FEELINGS OF UNSTEADINESS: 0

## 2024-05-30 ASSESSMENT — PAIN SCALES - GENERAL
PAINLEVEL_OUTOF10: 0 - NO PAIN
PAINLEVEL: 0 - NO PAIN
PAINLEVEL_OUTOF10: 0 - NO PAIN
PAINLEVEL_OUTOF10: 0 - NO PAIN

## 2024-05-30 ASSESSMENT — LIFESTYLE VARIABLES
SKIP TO QUESTIONS 9-10: 1
HOW OFTEN DO YOU HAVE A DRINK CONTAINING ALCOHOL: NEVER
AUDIT-C TOTAL SCORE: 0
HOW MANY STANDARD DRINKS CONTAINING ALCOHOL DO YOU HAVE ON A TYPICAL DAY: PATIENT DOES NOT DRINK
HOW OFTEN DO YOU HAVE 6 OR MORE DRINKS ON ONE OCCASION: NEVER
AUDIT-C TOTAL SCORE: 0

## 2024-05-30 ASSESSMENT — ACTIVITIES OF DAILY LIVING (ADL): LACK_OF_TRANSPORTATION: NO

## 2024-05-30 NOTE — H&P
Obstetrical Admission History and Physical     Meaghan Gaona is a 33 y.o.  at 37w0d. MARK: 2024, by Last Menstrual Period c/w 11.6 wk US. Estimated fetal weight on  at 36.5 wga: 2259g (4% with AC 2%). She has had prenatal care with Dr. East .    Chief Complaint: Hypertension    Assessment/Plan      Presumed gHTN in s/o mild range Bps not yet 4 hrs apart and sFGR  - h/o PEC at 31 wks with prior pregnancy  - BP mild range at home this am and at office today  - BPs cycled in triage and have been normotensive, but an elevation in baseline  - No symptoms of severe features (headache, scotoma, RUQ pain, chest pain, shortness of breath)  - HELLP labs negative and P:C 0.17    sFGR  - on  at 36.5 wga: 2259g (4% with AC 2%)    Breech Presentation  - pt desires ECV ( scheduled for )  - NPO time 1130  - needs consented  - IOL or pCS depending on ECV results    Hypoglycemia  - glucose 48  - given D5LR bolus for continued NPO status  - pt asymptomatic    Depression  - continue Zoloft 50 daily  - denies SI/HI    Maternal Well-being  - Discussed plan of care  - Emotional support and reassurance provided  - All questions and concerns addressed    IUP @ 37w0d  - prenatal lab work reviewed and wnl  - Cat I tracing   - GBS positive , treat w/ PCN if version successful    Contraception  - Pops    The patient's pregnancy complications have been discussed with the patient in detail.  Admit to inpatient status. I anticipate that this patient will require a stay exceeding 2 midnights.  Active management of the pregnancy complications.     Admit to L&D. Report to Dr. Cochran for continuation of care  Staffed with Dr. Garett Marcus, APRN-CNP    Principal Problem:    Elevated blood pressure affecting pregnancy in third trimester, antepartum ([facility])      Pregnancy Problems (from 23 to present)       Problem Noted Resolved    Elevated BP without diagnosis of hypertension 2024 by Edna BECERRA  MD Kita No    Priority:  Medium      Overview Addendum 2024  1:11 PM by Edna East MD     - Mild range today in office and this AM on home cuff         Fetal malpresentation ([facility]) 2024 by Edna East MD No    Priority:  Medium      Fetal growth restriction antepartum ([facility]) 2024 by Edna East MD No    Priority:  Medium      Overview Signed 2024  4:43 PM by Edna East MD     - Continue weekly dopplers/BPP with q3wk growths          Depression during pregnancy in first trimester ([facility]) 2023 by Ana Lau No    Priority:  Medium      Overview Addendum 2024  3:20 PM by Edna East MD     - Well controlled on Zoloft 50 mg  - Declines counseling         Supervision of high risk pregnancy, antepartum ([facility]) 2023 by Ana Lau No    Priority:  Medium      Overview Addendum 2024  4:42 PM by Edna East MD     [x] Initial BMI: 26  [x] Dating US: 8 wk US 23  [x] Prenatal Labs:   [x] Pap:   [x] Genetic Screening:    [x] bASA: 12 weeks  [x] Anatomy US: Normal - FGR  [x] 1hr GCT at 24-28wks: 124  [x] Tdap (27-36wks): 4/  [x] Flu/COVID: Counseled  [x] Rhogam (if Rh neg):   [x] GBS at 36 wks:   [x] Breastfeeding: Yes  [x] Postpartum Birth control method: POP  [] 39 weeks discussion of IOL vs. Expectant management:  [x] Mode of delivery: Anticipate          H/O pre-eclampsia in prior pregnancy, currently pregnant ([facility]) 2023 by Ana Lau No    Priority:  Medium      Overview Addendum 2023  3:49 PM by Mikhail Fonseca MD     - Admitted for sPEC and delivered at 31 weeks in prior pregnancy  - MFM consult given history of FGR and sPEC to determine US timing  - Normotensive at new OB, no signs of chronic HTN  [ ] growth US Q 4 weeks starting at 24 weeks                 Subjective   Meaghan is here from the office with mild range BP. Pt reports mild range at home this am also. She has a h/o PEC with IOL  at 31 weeks in previous pregnancy. She denies HA, scotoma, chest pain, or SOB. She does reports some RUQ pain, but feels this is from the breech position of the baby. She denies vaginal bleeding, loss of fluid, or regular contractions. She has occasional corrie-angelo and good fetal movement.      Obstetrical History   OB History    Para Term  AB Living   2 1   1   1   SAB IAB Ectopic Multiple Live Births           1      # Outcome Date GA Lbr Matias/2nd Weight Sex Delivery Anes PTL Lv   2 Current            1  23 31w1d  1.474 kg  Vag-Spont   ALEXEI       Past Medical History  Past Medical History:   Diagnosis Date    Depression     Migraine         Past Surgical History   Past Surgical History:   Procedure Laterality Date    MR CHEST ANGIO WO IV CONTRAST  2019    MR CHEST ANGIO WO IV CONTRAST 2019 [facility]       Social History  Social History     Tobacco Use    Smoking status: Never    Smokeless tobacco: Never   Substance Use Topics    Alcohol use: Not Currently     Substance and Sexual Activity   Drug Use Never       Allergies  Patient has no known allergies.     Medications  Medications Prior to Admission   Medication Sig Dispense Refill Last Dose    aspirin 81 mg chewable tablet Chew 1 tablet (81 mg) once daily.   2024    PNV no.95/ferrous fum/folic ac (PRENATAL ORAL) Take 1 tablet by mouth once daily.   2024    sertraline (Zoloft) 50 mg tablet Take 1 tablet (50 mg) by mouth once daily. as directed 90 tablet 3 2024       Objective    Last Vitals  Temp Pulse Resp BP MAP O2 Sat   36.6 °C (97.9 °F) 85 16 135/83   97 %     Physical Examination  Physical Exam  Constitutional:       Appearance: Normal appearance.   HENT:      Head: Normocephalic and atraumatic.      Mouth/Throat:      Mouth: Mucous membranes are moist.   Cardiovascular:      Rate and Rhythm: Normal rate and regular rhythm.      Heart sounds: No murmur heard.  Pulmonary:      Effort: Pulmonary  effort is normal.      Breath sounds: Normal breath sounds.   Abdominal:      Palpations: Abdomen is soft.      Comments: Mild RUQ pain with palpation   Genitourinary:     Comments: Fetal Assessment  Movement: Present  Mode: External US  Baseline Fetal Heart Rate (bpm): 130 bpm  Baseline Classification: Normal  Variability: Moderate (Between 6 and 25 BPM)  Pattern: Accelerations   Fetal Decelerations: No  Contraction Frequency: irregular, abdomen soft to palpate    Musculoskeletal:         General: Normal range of motion.      Cervical back: Normal range of motion.      Right lower leg: No edema.      Left lower leg: No edema.   Skin:     General: Skin is warm and dry.      Capillary Refill: Capillary refill takes less than 2 seconds.   Neurological:      Mental Status: She is alert and oriented to person, place, and time.   Psychiatric:         Behavior: Behavior normal.       Lab Review  Admission on 05/30/2024   Component Date Value Ref Range Status    POC Color, Urine 05/30/2024 Yellow  Straw, Yellow, Light-Yellow Final    POC Appearance, Urine 05/30/2024 Clear  Clear Final    POC Glucose, Urine 05/30/2024 NEGATIVE  NEGATIVE mg/dl Final    POC Bilirubin, Urine 05/30/2024 NEGATIVE  NEGATIVE Final    POC Ketones, Urine 05/30/2024 NEGATIVE  NEGATIVE mg/dl Final    POC Specific Gravity, Urine 05/30/2024 1.015  1.005 - 1.035 Final    POC Blood, Urine 05/30/2024 NEGATIVE  NEGATIVE Final    POC PH, Urine 05/30/2024 7.0  No Reference Range Established PH Final    POC Protein, Urine 05/30/2024 NEGATIVE  NEGATIVE, 30 (1+) mg/dl Final    POC Urobilinogen, Urine 05/30/2024 0.2  0.2, 1.0 EU/DL Final    Poc Nitrite, Urine 05/30/2024 NEGATIVE  NEGATIVE Final    POC Leukocytes, Urine 05/30/2024 SMALL (1+) (A)  NEGATIVE Final    Glucose 05/30/2024 48 (LL)  74 - 99 mg/dL Final    Sodium 05/30/2024 133 (L)  136 - 145 mmol/L Final    Potassium 05/30/2024 3.8  3.5 - 5.3 mmol/L Final    Chloride 05/30/2024 100  98 - 107 mmol/L Final     Bicarbonate 05/30/2024 22  21 - 32 mmol/L Final    Anion Gap 05/30/2024 15  10 - 20 mmol/L Final    Urea Nitrogen 05/30/2024 9  6 - 23 mg/dL Final    Creatinine 05/30/2024 0.66  0.50 - 1.05 mg/dL Final    eGFR 05/30/2024 >90  >60 mL/min/1.73m*2 Final    Calculations of estimated GFR are performed using the 2021 CKD-EPI Study Refit equation without the race variable for the IDMS-Traceable creatinine methods.  https://jasn.asnjournals.org/content/early/2021/09/22/ASN.8719345056    Calcium 05/30/2024 8.4 (L)  8.6 - 10.6 mg/dL Final    Albumin 05/30/2024 3.4  3.4 - 5.0 g/dL Final    Alkaline Phosphatase 05/30/2024 206 (H)  33 - 110 U/L Final    Total Protein 05/30/2024 6.5  6.4 - 8.2 g/dL Final    AST 05/30/2024 17  9 - 39 U/L Final    Bilirubin, Total 05/30/2024 0.4  0.0 - 1.2 mg/dL Final    ALT 05/30/2024 11  7 - 45 U/L Final    Patients treated with Sulfasalazine may generate falsely decreased results for ALT.    WBC 05/30/2024 9.0  4.4 - 11.3 x10*3/uL Final    nRBC 05/30/2024 0.0  0.0 - 0.0 /100 WBCs Final    RBC 05/30/2024 4.13  4.00 - 5.20 x10*6/uL Final    Hemoglobin 05/30/2024 11.7 (L)  12.0 - 16.0 g/dL Final    Hematocrit 05/30/2024 35.1 (L)  36.0 - 46.0 % Final    MCV 05/30/2024 85  80 - 100 fL Final    MCH 05/30/2024 28.3  26.0 - 34.0 pg Final    MCHC 05/30/2024 33.3  32.0 - 36.0 g/dL Final    RDW 05/30/2024 13.3  11.5 - 14.5 % Final    Platelets 05/30/2024 215  150 - 450 x10*3/uL Final    LDH 05/30/2024 209  84 - 246 U/L Final    Total Protein, Urine Random 05/30/2024 10  5 - 24 mg/dL Final    Creatinine, Urine Random 05/30/2024 59.2  20.0 - 320.0 mg/dL Final    T. Protein/Creatinine Ratio 05/30/2024 0.17  0.00 - 0.17 mg/mg Creat Final    POCT Glucose 05/30/2024 56 (L)  74 - 99 mg/dL Final

## 2024-05-30 NOTE — ANESTHESIA PREPROCEDURE EVALUATION
Patient: Meaghan Gaona    Evaluation Method: In-person visit    Procedure Information    Date: 05/30/24  Procedure: Labor Consult         Relevant Problems   Anesthesia (within normal limits)      Cardiac  GHTN   (+) HTN (hypertension)      Pulmonary (within normal limits)      Neuro  Takes zoloft, doesn,t take meds for migraines   (+) Anxiety   (+) Depression during pregnancy in first trimester ([facility])      GI (within normal limits)      /Renal (within normal limits)      Liver (within normal limits)      Endocrine (within normal limits)      Hematology (within normal limits)      Musculoskeletal (within normal limits)      HEENT (within normal limits)      ID (within normal limits)      Skin (within normal limits)      GYN   (+) Supervision of high risk pregnancy, antepartum ([facility])       Clinical information reviewed:    Allergies  Meds  Problems  Med Hx  Surg Hx            NPO Detail:  No data recorded     OB/GYN     Physical Exam    Airway  Mallampati: II  TM distance: >3 FB  Neck ROM: full     Cardiovascular    Dental    Pulmonary    Abdominal        Last food at 11;30am ,5/30  Anesthesia Plan    History of general anesthesia?: yes  History of complications of general anesthesia?: no    ASA 2     CSE     The patient is not a current smoker.    Anesthetic plan and risks discussed with patient.  Use of blood products discussed with patient who.    Plan discussed with CAA.        
Additional Notes: Size after curettage 0.7 x 0.7
Detail Level: Simple

## 2024-05-30 NOTE — ASSESSMENT & PLAN NOTE
- Mild range today in office and this AM on home cuff  - Anticipate will rule in formally for gHTN - to L&D for eval

## 2024-05-30 NOTE — PROGRESS NOTES
SUBJECTIVE  Meaghan Gaona is a 33 y.o.  at 37w0d with an estimated date of delivery of 2024, by Last Menstrual Period who presents for a routine prenatal visit.    She denies loss of fluid, vaginal bleeding, regular contractions/cramping, and decreased fetal movement. Denies PreE symptoms.     OBJECTIVE  Vitals:    24 1300   BP: 141/89   Weight: 80.3 kg (177 lb)          ASSESSMENT & PLAN    Supervision of high risk pregnancy, antepartum ([facility])  - GBS pos  - POPs    Fetal growth restriction antepartum ([facility])  - Weekly dopplers  - Scheduled for ECV/IOL at 38 weeks - keep if triage eval normal today    Fetal malpresentation ([facility])  - For ECV/IOL    Elevated BP without diagnosis of hypertension  - Mild range today in office and this AM on home cuff  - Anticipate will rule in formally for gHTN - to L&D for eval    To L&D for evaluation, anticipate delivery    Edna East MD  Obstetrics & Gynecology  24

## 2024-05-31 ENCOUNTER — ANESTHESIA (OUTPATIENT)
Dept: OBSTETRICS AND GYNECOLOGY | Facility: HOSPITAL | Age: 34
End: 2024-05-31
Payer: COMMERCIAL

## 2024-05-31 ENCOUNTER — ANESTHESIA EVENT (OUTPATIENT)
Dept: OBSTETRICS AND GYNECOLOGY | Facility: HOSPITAL | Age: 34
End: 2024-05-31
Payer: COMMERCIAL

## 2024-05-31 LAB
BB ANTIBODY IDENTIFICATION: NORMAL
CASE #: NORMAL
PATH REV-IMMUNOHEMATOLOGY-PR30: NORMAL

## 2024-05-31 PROCEDURE — 7210000002 HC LABOR PER HOUR

## 2024-05-31 PROCEDURE — 1210000001 HC SEMI-PRIVATE ROOM DAILY

## 2024-05-31 PROCEDURE — 3700000001 HC GENERAL ANESTHESIA TIME - INITIAL BASE CHARGE: Performed by: STUDENT IN AN ORGANIZED HEALTH CARE EDUCATION/TRAINING PROGRAM

## 2024-05-31 PROCEDURE — 2500000004 HC RX 250 GENERAL PHARMACY W/ HCPCS (ALT 636 FOR OP/ED): Performed by: NURSE PRACTITIONER

## 2024-05-31 PROCEDURE — 88307 TISSUE EXAM BY PATHOLOGIST: CPT | Mod: TC,SUR | Performed by: STUDENT IN AN ORGANIZED HEALTH CARE EDUCATION/TRAINING PROGRAM

## 2024-05-31 PROCEDURE — 2500000002 HC RX 250 W HCPCS SELF ADMINISTERED DRUGS (ALT 637 FOR MEDICARE OP, ALT 636 FOR OP/ED): Performed by: NURSE PRACTITIONER

## 2024-05-31 PROCEDURE — 59400 OBSTETRICAL CARE: CPT | Performed by: STUDENT IN AN ORGANIZED HEALTH CARE EDUCATION/TRAINING PROGRAM

## 2024-05-31 PROCEDURE — 2500000004 HC RX 250 GENERAL PHARMACY W/ HCPCS (ALT 636 FOR OP/ED): Mod: JZ | Performed by: ANESTHESIOLOGIST ASSISTANT

## 2024-05-31 PROCEDURE — 2500000005 HC RX 250 GENERAL PHARMACY W/O HCPCS

## 2024-05-31 PROCEDURE — 59050 FETAL MONITOR W/REPORT: CPT

## 2024-05-31 PROCEDURE — 10H07YZ INSERTION OF OTHER DEVICE INTO PRODUCTS OF CONCEPTION, VIA NATURAL OR ARTIFICIAL OPENING: ICD-10-PCS | Performed by: OBSTETRICS & GYNECOLOGY

## 2024-05-31 PROCEDURE — 3700000002 HC GENERAL ANESTHESIA TIME - EACH INCREMENTAL 1 MINUTE: Performed by: STUDENT IN AN ORGANIZED HEALTH CARE EDUCATION/TRAINING PROGRAM

## 2024-05-31 PROCEDURE — 2500000005 HC RX 250 GENERAL PHARMACY W/O HCPCS: Performed by: STUDENT IN AN ORGANIZED HEALTH CARE EDUCATION/TRAINING PROGRAM

## 2024-05-31 PROCEDURE — 2500000004 HC RX 250 GENERAL PHARMACY W/ HCPCS (ALT 636 FOR OP/ED)

## 2024-05-31 PROCEDURE — 7100000016 HC LABOR RECOVERY PER HOUR

## 2024-05-31 PROCEDURE — 2500000005 HC RX 250 GENERAL PHARMACY W/O HCPCS: Performed by: NURSE ANESTHETIST, CERTIFIED REGISTERED

## 2024-05-31 PROCEDURE — 3E0E77Z INTRODUCTION OF ELECTROLYTIC AND WATER BALANCE SUBSTANCE INTO PRODUCTS OF CONCEPTION, VIA NATURAL OR ARTIFICIAL OPENING: ICD-10-PCS | Performed by: OBSTETRICS & GYNECOLOGY

## 2024-05-31 PROCEDURE — 2500000001 HC RX 250 WO HCPCS SELF ADMINISTERED DRUGS (ALT 637 FOR MEDICARE OP): Performed by: STUDENT IN AN ORGANIZED HEALTH CARE EDUCATION/TRAINING PROGRAM

## 2024-05-31 RX ORDER — ONDANSETRON 4 MG/1
4 TABLET, FILM COATED ORAL EVERY 6 HOURS PRN
Status: DISCONTINUED | OUTPATIENT
Start: 2024-05-31 | End: 2024-06-03 | Stop reason: HOSPADM

## 2024-05-31 RX ORDER — ONDANSETRON HYDROCHLORIDE 2 MG/ML
4 INJECTION, SOLUTION INTRAVENOUS EVERY 6 HOURS PRN
Status: DISCONTINUED | OUTPATIENT
Start: 2024-05-31 | End: 2024-06-03 | Stop reason: HOSPADM

## 2024-05-31 RX ORDER — LIDOCAINE 560 MG/1
1 PATCH PERCUTANEOUS; TOPICAL; TRANSDERMAL
Status: DISCONTINUED | OUTPATIENT
Start: 2024-05-31 | End: 2024-06-03 | Stop reason: HOSPADM

## 2024-05-31 RX ORDER — MISOPROSTOL 200 UG/1
800 TABLET ORAL ONCE AS NEEDED
Status: DISCONTINUED | OUTPATIENT
Start: 2024-05-31 | End: 2024-06-03 | Stop reason: HOSPADM

## 2024-05-31 RX ORDER — FENTANYL/BUPIVACAINE/NS/PF 2MCG/ML-.1
PLASTIC BAG, INJECTION (ML) INJECTION CONTINUOUS PRN
Status: DISCONTINUED | OUTPATIENT
Start: 2024-05-31 | End: 2024-05-31

## 2024-05-31 RX ORDER — SIMETHICONE 80 MG
80 TABLET,CHEWABLE ORAL 4 TIMES DAILY PRN
Status: DISCONTINUED | OUTPATIENT
Start: 2024-05-31 | End: 2024-06-03 | Stop reason: HOSPADM

## 2024-05-31 RX ORDER — LIDOCAINE HYDROCHLORIDE 10 MG/ML
INJECTION INFILTRATION; PERINEURAL AS NEEDED
Status: DISCONTINUED | OUTPATIENT
Start: 2024-05-31 | End: 2024-05-31

## 2024-05-31 RX ORDER — LABETALOL HYDROCHLORIDE 5 MG/ML
20 INJECTION, SOLUTION INTRAVENOUS ONCE AS NEEDED
Status: DISCONTINUED | OUTPATIENT
Start: 2024-05-31 | End: 2024-06-03 | Stop reason: HOSPADM

## 2024-05-31 RX ORDER — OXYTOCIN 10 [USP'U]/ML
10 INJECTION, SOLUTION INTRAMUSCULAR; INTRAVENOUS ONCE AS NEEDED
Status: DISCONTINUED | OUTPATIENT
Start: 2024-05-31 | End: 2024-06-03 | Stop reason: HOSPADM

## 2024-05-31 RX ORDER — BISACODYL 10 MG/1
10 SUPPOSITORY RECTAL DAILY PRN
Status: DISCONTINUED | OUTPATIENT
Start: 2024-05-31 | End: 2024-06-03 | Stop reason: HOSPADM

## 2024-05-31 RX ORDER — TRANEXAMIC ACID 100 MG/ML
1000 INJECTION, SOLUTION INTRAVENOUS ONCE AS NEEDED
Status: DISCONTINUED | OUTPATIENT
Start: 2024-05-31 | End: 2024-06-03 | Stop reason: HOSPADM

## 2024-05-31 RX ORDER — ACETAMINOPHEN 325 MG/1
975 TABLET ORAL EVERY 6 HOURS
Status: DISCONTINUED | OUTPATIENT
Start: 2024-05-31 | End: 2024-06-03 | Stop reason: HOSPADM

## 2024-05-31 RX ORDER — LOPERAMIDE HYDROCHLORIDE 2 MG/1
4 CAPSULE ORAL EVERY 2 HOUR PRN
Status: DISCONTINUED | OUTPATIENT
Start: 2024-05-31 | End: 2024-06-03 | Stop reason: HOSPADM

## 2024-05-31 RX ORDER — ADHESIVE BANDAGE
10 BANDAGE TOPICAL
Status: DISCONTINUED | OUTPATIENT
Start: 2024-05-31 | End: 2024-06-03 | Stop reason: HOSPADM

## 2024-05-31 RX ORDER — POLYETHYLENE GLYCOL 3350 17 G/17G
17 POWDER, FOR SOLUTION ORAL 2 TIMES DAILY PRN
Status: DISCONTINUED | OUTPATIENT
Start: 2024-05-31 | End: 2024-06-03 | Stop reason: HOSPADM

## 2024-05-31 RX ORDER — LIDOCAINE HCL/EPINEPHRINE/PF 2%-1:200K
VIAL (ML) INJECTION AS NEEDED
Status: DISCONTINUED | OUTPATIENT
Start: 2024-05-31 | End: 2024-05-31

## 2024-05-31 RX ORDER — DIPHENHYDRAMINE HCL 25 MG
25 CAPSULE ORAL EVERY 6 HOURS PRN
Status: DISCONTINUED | OUTPATIENT
Start: 2024-05-31 | End: 2024-06-03 | Stop reason: HOSPADM

## 2024-05-31 RX ORDER — CARBOPROST TROMETHAMINE 250 UG/ML
250 INJECTION, SOLUTION INTRAMUSCULAR ONCE AS NEEDED
Status: DISCONTINUED | OUTPATIENT
Start: 2024-05-31 | End: 2024-06-03 | Stop reason: HOSPADM

## 2024-05-31 RX ORDER — NIFEDIPINE 10 MG/1
10 CAPSULE ORAL ONCE AS NEEDED
Status: DISCONTINUED | OUTPATIENT
Start: 2024-05-31 | End: 2024-06-03 | Stop reason: HOSPADM

## 2024-05-31 RX ORDER — ENOXAPARIN SODIUM 100 MG/ML
40 INJECTION SUBCUTANEOUS EVERY 24 HOURS
Status: DISCONTINUED | OUTPATIENT
Start: 2024-06-01 | End: 2024-06-03 | Stop reason: HOSPADM

## 2024-05-31 RX ORDER — IBUPROFEN 600 MG/1
600 TABLET ORAL EVERY 6 HOURS
Status: DISCONTINUED | OUTPATIENT
Start: 2024-05-31 | End: 2024-06-03 | Stop reason: HOSPADM

## 2024-05-31 RX ORDER — HYDRALAZINE HYDROCHLORIDE 20 MG/ML
5 INJECTION INTRAMUSCULAR; INTRAVENOUS ONCE AS NEEDED
Status: DISCONTINUED | OUTPATIENT
Start: 2024-05-31 | End: 2024-06-03 | Stop reason: HOSPADM

## 2024-05-31 RX ORDER — OXYTOCIN/0.9 % SODIUM CHLORIDE 30/500 ML
60 PLASTIC BAG, INJECTION (ML) INTRAVENOUS ONCE AS NEEDED
Status: DISCONTINUED | OUTPATIENT
Start: 2024-05-31 | End: 2024-06-03 | Stop reason: HOSPADM

## 2024-05-31 RX ORDER — FENTANYL CITRATE 50 UG/ML
INJECTION, SOLUTION INTRAMUSCULAR; INTRAVENOUS CONTINUOUS PRN
Status: DISCONTINUED | OUTPATIENT
Start: 2024-05-31 | End: 2024-05-31

## 2024-05-31 RX ORDER — METHYLERGONOVINE MALEATE 0.2 MG/ML
0.2 INJECTION INTRAVENOUS ONCE AS NEEDED
Status: DISCONTINUED | OUTPATIENT
Start: 2024-05-31 | End: 2024-06-03 | Stop reason: HOSPADM

## 2024-05-31 RX ORDER — DIPHENHYDRAMINE HYDROCHLORIDE 50 MG/ML
25 INJECTION INTRAMUSCULAR; INTRAVENOUS EVERY 6 HOURS PRN
Status: DISCONTINUED | OUTPATIENT
Start: 2024-05-31 | End: 2024-06-03 | Stop reason: HOSPADM

## 2024-05-31 RX ADMIN — SERTRALINE HYDROCHLORIDE 50 MG: 50 TABLET ORAL at 09:52

## 2024-05-31 RX ADMIN — IBUPROFEN 600 MG: 600 TABLET, FILM COATED ORAL at 15:05

## 2024-05-31 RX ADMIN — BENZOCAINE AND LEVOMENTHOL 1 APPLICATION: 200; 5 SPRAY TOPICAL at 16:20

## 2024-05-31 RX ADMIN — ACETAMINOPHEN 975 MG: 325 TABLET ORAL at 20:39

## 2024-05-31 RX ADMIN — LIDOCAINE HYDROCHLORIDE 5 ML: 10 INJECTION, SOLUTION INFILTRATION; PERINEURAL at 10:04

## 2024-05-31 RX ADMIN — EPINEPHRINE 14 ML/HR: 1 INJECTION, SOLUTION, CONCENTRATE INTRAVENOUS at 10:04

## 2024-05-31 RX ADMIN — LIDOCAINE HYDROCHLORIDE,EPINEPHRINE BITARTRATE 5 ML: 20; .005 INJECTION, SOLUTION EPIDURAL; INFILTRATION; INTRACAUDAL; PERINEURAL at 12:15

## 2024-05-31 RX ADMIN — IBUPROFEN 600 MG: 600 TABLET, FILM COATED ORAL at 20:40

## 2024-05-31 RX ADMIN — SODIUM CHLORIDE, POTASSIUM CHLORIDE, SODIUM LACTATE AND CALCIUM CHLORIDE 125 ML/HR: 600; 310; 30; 20 INJECTION, SOLUTION INTRAVENOUS at 05:28

## 2024-05-31 RX ADMIN — WITCH HAZEL 1 EACH: 5 CLOTH TOPICAL at 16:20

## 2024-05-31 RX ADMIN — PENICILLIN G 3 MILLION UNITS: 3000000 INJECTION, SOLUTION INTRAVENOUS at 01:35

## 2024-05-31 RX ADMIN — EPINEPHRINE 14 ML/HR: 1 INJECTION, SOLUTION, CONCENTRATE INTRAVENOUS at 03:05

## 2024-05-31 RX ADMIN — ACETAMINOPHEN 975 MG: 325 TABLET ORAL at 15:05

## 2024-05-31 RX ADMIN — PENICILLIN G 3 MILLION UNITS: 3000000 INJECTION, SOLUTION INTRAVENOUS at 09:52

## 2024-05-31 RX ADMIN — PENICILLIN G 3 MILLION UNITS: 3000000 INJECTION, SOLUTION INTRAVENOUS at 05:28

## 2024-05-31 RX ADMIN — LIDOCAINE HYDROCHLORIDE,EPINEPHRINE BITARTRATE 5 ML: 20; .005 INJECTION, SOLUTION EPIDURAL; INFILTRATION; INTRACAUDAL; PERINEURAL at 12:10

## 2024-05-31 SDOH — HEALTH STABILITY: MENTAL HEALTH: CURRENT SMOKER: 0

## 2024-05-31 ASSESSMENT — PAIN DESCRIPTION - LOCATION: LOCATION: BACK

## 2024-05-31 ASSESSMENT — PAIN SCALES - GENERAL
PAINLEVEL_OUTOF10: 1
PAINLEVEL_OUTOF10: 0 - NO PAIN
PAINLEVEL_OUTOF10: 1
PAINLEVEL_OUTOF10: 5 - MODERATE PAIN
PAINLEVEL_OUTOF10: 0 - NO PAIN

## 2024-05-31 ASSESSMENT — PAIN - FUNCTIONAL ASSESSMENT: PAIN_FUNCTIONAL_ASSESSMENT: 0-10

## 2024-05-31 ASSESSMENT — PAIN DESCRIPTION - DESCRIPTORS: DESCRIPTORS: SORE

## 2024-05-31 NOTE — CARE PLAN
Problem: Pain - Adult  Goal: Verbalizes/displays adequate comfort level or baseline comfort level  Outcome: Progressing     Problem: Postpartum  Goal: Experiences normal postpartum course  Outcome: Progressing     Patient has had stable vital signs and assessments, pain well controlled, and bleeding has been appropriate this shift.

## 2024-05-31 NOTE — SIGNIFICANT EVENT
Pt pushing now for just over an hour.  Initially with deep variables with slow recovery so pitocin turned off.  Now pushing well with good descent to +2 with every push however between pushes, head goes back to +1 station.  Mod variaiblity and pt afebrile.  Pt still with strong effort.  However we reviewed that she is multiparous and pushing now for an hour so it is concerning for an arrest of descent.  Pt ok with continuing to push for now.  We reviewed trial of vacuum if head stays low enough.  If not, will potentially need an arrest of descent .  Pt understanding and agreeable.    Ford Wilson MD

## 2024-05-31 NOTE — ANESTHESIA PREPROCEDURE EVALUATION
Patient: Meaghan Gaona    Evaluation Method: In-person visit    Procedure Information    Date: 24  Procedure: Labor Consult     33 y.o.  at 37w0d. MARK: 2024, by Last Menstrual Period c/w 11.6 wk US. Estimated fetal weight on  at 36.5 wga: 2259g (4% with AC 2%).     Relevant Problems   Anesthesia (within normal limits)      Cardiac  GHTN   (+) HTN (hypertension)      Pulmonary (within normal limits)      Neuro  Takes zoloft, doesn,t take meds for migraines   (+) Anxiety   (+) Depression during pregnancy in first trimester ([facility])      GI (within normal limits)      /Renal (within normal limits)      Liver (within normal limits)      Endocrine (within normal limits)      Hematology (within normal limits)      Musculoskeletal (within normal limits)      HEENT (within normal limits)      ID (within normal limits)      Skin (within normal limits)      GYN   (+) Supervision of high risk pregnancy, antepartum ([facility])       Clinical information reviewed:    Allergies  Meds  Problems  Med Hx  Surg Hx            NPO Detail:  NPO/Void Status  Date of Last Solid: 24  Time of Last Solid: 1130         OB/Gyn Evaluation    Present Pregnancy    (+) , malpresentation (Breech will attempt ECV)   Obstetric History                Physical Exam    Airway  Mallampati: II  TM distance: >3 FB  Neck ROM: full     Cardiovascular    Dental    Pulmonary    Abdominal          Anesthesia Plan    History of general anesthesia?: yes  History of complications of general anesthesia?: no    ASA 2     CSE     The patient is not a current smoker.    Anesthetic plan and risks discussed with patient.  Use of blood products discussed with patient who.    Plan discussed with CAA.

## 2024-05-31 NOTE — ANESTHESIA POSTPROCEDURE EVALUATION
Patient: Meaghan Gaona    Procedure Summary       Date: 05/30/24 Room / Location:     Anesthesia Start: 1954 Anesthesia Stop: 2106    Procedure: Procedure Not Yet Scheduled Diagnosis:     Scheduled Providers:  Responsible Provider: Abel Malhotra DO    Anesthesia Type: CSE ASA Status: 2            Anesthesia Type: CSE    Vitals Value Taken Time   /59 05/31/24 0030   Temp 36.7 °C (98.1 °F) 05/31/24 0030   Pulse 73 05/31/24 0030   Resp 18 05/31/24 0030   SpO2 97 % 05/31/24 0030       Anesthesia Post Evaluation    Patient location during evaluation: bedside  Patient participation: complete - patient participated  Level of consciousness: awake and alert  Pain management: adequate  Airway patency: patent  Cardiovascular status: acceptable  Respiratory status: acceptable  Hydration status: acceptable  Postoperative Nausea and Vomiting: none        No notable events documented.

## 2024-05-31 NOTE — ANESTHESIA PROCEDURE NOTES
CSE Block    Start time: 5/30/2024 8:04 PM  Reason for block: at surgeon's request}  Staffing  Performed: LEN   Authorized by: Abel Malhotra DO    Performed by: CORI Le    Preanesthetic Checklist  Completed: patient identified, IV checked, risks and benefits discussed, surgical consent, monitors and equipment checked, pre-op evaluation, timeout performed and sterile techniques followed  Block Timeout  RN/Licensed healthcare professional reads aloud to the Anesthesia provider and entire team: Patient identity, procedure with side and site, patient position, and as applicable the availability of implants/special equipment/special requirements.  Patient on coagulant treatment: yes (baby ASA)  Timeout performed at: 5/30/2024 7:54 AM    CSE Block  Patient position: sitting  Prep: ChloraPrep  Sterility prep: cap, drape and mask  Sedation level: no sedation  Patient monitoring: blood pressure, continuous pulse oximetry and heart rate  Approach: midline  Local numbing: lidocaine 1% to skin and subcutaneous tissues  Vertebral space: lumbar  L3-4  Guidance: landmark technique    Epidural Needle  LIANNA technique: saline  Needle type: Tuohy   Needle gauge: 17 G  Needle length: 10.2 cm  Spinal Needle  Needle type: pencil-point   Needle gauge: 27 G  Needle length: 10.2 cm  Free flow CSF: yes  Epidural Catheter  Catheter type: multi-orifice  Catheter size: 19 G  Catheter at skin depth: 9 cm  Catheter securement method: clear occlusive dressing and liquid medical adhesive              Assessment  Sensory level: T4 bilateral  Block outcome: patient comfortable  Number of attempts: 1  Procedure assessment: patient tolerated procedure well with no immediate complications

## 2024-05-31 NOTE — DISCHARGE INSTRUCTIONS

## 2024-05-31 NOTE — OP NOTE
ECV Note:    Date: 2024  OR Location: MAC 2 OB    Name: Meaghan Gaona, : 1990, Age: 33 y.o., MRN: [id number], Sex: female    Diagnosis  Pre-op Diagnosis     * Breech presentation, fetus 1 of multiple gestation ([facility]) [O32.1XX1] same     Procedures  * No procedures documented on diagnosis form *    Surgeons      * Ani Bajwa - Primary    Resident/Fellow/Other Assistant:  Marialuisa Cochran MD PGY1      Procedure Summary  Anesthesia: Combined Spinal-epidural  ASA: ASA status not filed in the log.  Anesthesia Staff: No anesthesia staff entered.  Estimated Blood Loss: none mL  Intra-op Medications: Terbutaline      Intraprocedure I/O Totals       None           Specimen: No specimens collected      Procedure Details:  The patient was seen in the preoperative area.   After administration of regional analgesia, ultrasound was performed which re-demonstrated breech presentation, with the fetal head on the right, the back to maternal right, and the breech in the pelvis.  Fetal heart rate baseline was 140.  Tristan catheter and SCDs were applied in case of need to convert quickly to  delivery.  The patient was placed in trendelenburg positioning and ultrasound gel was applied to the abdomen.  Terbutaline was administered intramuscularly.  The breech was destationed out of the pelvis and lateral pressure was applied to the fetal vertex.  By palpation the vertex was felt to displace to the left.  Fetal heart rate was assessed by ultrasound at this time and was visually 145.  A period of rest was taken for 1 minute. The heart rate was re-assessed by ultrasound and was 140, with normal fetal movement observed by palpation and by ultrasound.  Pressure was again applied to the fetal breech in a cephalad and to the patient's right direction while caudad pressure was applied to the fetal vertex.  Fetal head progressed to the maternal left.  Again FHR was evaluated by ultrasound and was 135.  Again a 1 minute period  of rest was taken after which FHR was assessed and was 135 by ultrasound, with fetal movement again observed.  Additional caudad pressure was applied to the fetal vertex and at this point the vertex was felt to advance to the pelvis.  Ultrasound confirmed cephalic presentation.    Cervix was 2/50/-3 on examination and she was AROMed for clear. FHR was visually 135 vand EFM was re-applied and demonstrated category. EFM was continued for approximately 30 minutes in the OR, became cat 1 after occasional variable decelerations, and the procedure was concluded.  The patient was returned to her room on L&D.   Dr Bajwa and Dr. Barksdale were present for the entire procedure.    Complications:  None; patient tolerated the procedure well.     Disposition:  L&D room  Condition: stable    Marialuisa Cochran MD PGY1

## 2024-05-31 NOTE — SIGNIFICANT EVENT
IUPC, amnioinfusion    House staff to patient bedside for variable decelerations    Subjective:  Patient resting in bed    Objective:  Cervical Exam  Dilation: 4  Effacement (%): 90  Fetal Station: -2  Presentation: Vertex (per BSUS performed by Dimas BANKS)  Method: Manual  OB Examiner: Dimas BANKS  Fetal Assessment  Movement: Present  Mode: External US  Doppler/Fetoscope Rate: 140 BPM  Baseline Fetal Heart Rate (bpm): 130 bpm  Baseline Classification: Normal  Variability: Moderate (Between 6 and 25 BPM)  Pattern: Accelerations, Variable decelerations  FHR Category: Category II   Fetal Decelerations: No  Contraction Frequency: 2-5    Assessment&Plan:  - Epidural infusing with good pain control  - CEFM; variable decelerations  - IUPC placed and amnioinfusion started, patient tolerated the procedure well.    Marialuisa Cochran MD PGY1

## 2024-05-31 NOTE — SIGNIFICANT EVENT
Subjective:  Pt reporting urge to push w/ every contraction.     Objective:  Cervical Exam: 8/90/-1   FHT: baseline 135bpm, moderate variability, no accelerations, + early decel   Tocometry: contractions q1-2 mins     Assessment and Plan:  Pt in active labor, continues to make cervical change. Continue pitocin, titrate per protocol. Continue to monitor for cervical change. CEFM: Cat I tracing.     Patient seen and discussed with Dr. Toledo and Dr. Steve Ash (MS4)     I saw and evaluated the patient and discussed with the MS4, edits made in text above    Bina Toledo MD  PGY-3, Obstetrics and Gynecology

## 2024-05-31 NOTE — ANESTHESIA PREPROCEDURE EVALUATION
Patient: Meaghan Gaona    Evaluation Method: In-person visit    Procedure Information    Date: 24  Procedure: Labor Consult     33 y.o.  at 37w0d.    Relevant Problems   Anesthesia (within normal limits)      Cardiac  GHTN   (+) HTN (hypertension)      Pulmonary (within normal limits)      Neuro  Takes zoloft, no meds for migraine   (+) Anxiety   (+) Depression during pregnancy in first trimester (HHS-HCC)      GI (within normal limits)      /Renal (within normal limits)      Liver (within normal limits)      Endocrine (within normal limits)      Hematology (within normal limits)      Musculoskeletal (within normal limits)      HEENT (within normal limits)      ID (within normal limits)      Skin (within normal limits)      GYN   (+) Supervision of high risk pregnancy, antepartum ([facility])       Clinical information reviewed:    Allergies  Meds  Problems  Med Hx  Surg Hx            NPO Detail:  NPO/Void Status  Date of Last Solid: 24  Time of Last Solid: 1130         OB/Gyn Evaluation    Present Pregnancy    (+) , malpresentation (Breech on admission; Cephalic s/p ECV), fetal growth restriction (on  at 36.5 wga: 2259g (4% with AC 2%)), hypertensive disorder of pregnancy - gestational hypertension   Obstetric History                Physical Exam    Airway  Mallampati: II  TM distance: >3 FB  Neck ROM: full     Cardiovascular    Dental    Pulmonary    Abdominal          Anesthesia Plan    History of general anesthesia?: yes  History of complications of general anesthesia?: no    ASA 2     CSE   (S/p ECV with CSE.  Labor epidural now infusing)  The patient is not a current smoker.    Anesthetic plan and risks discussed with patient.  Use of blood products discussed with patient who.    Plan discussed with CAA.

## 2024-05-31 NOTE — INDIVIDUALIZED OVERALL PLAN OF CARE NOTE
Subjective:  Patient resting in bed    Objective:  Cervical Exam  Dilation: 4  Effacement (%): 60  Fetal Station: -2  Presentation: Vertex (per BSUS performed by Dimas BANKS)  Method: Manual  OB Examiner: Dimas BANKS  Fetal Assessment  Movement: Present  Mode: External US  Doppler/Fetoscope Rate: 140 BPM  Baseline Fetal Heart Rate (bpm): 125 bpm  Baseline Classification: Normal  Variability: Moderate (Between 6 and 25 BPM)  Pattern: Accelerations, Variable decelerations  FHR Category: Category II   Fetal Decelerations: No  Contraction Frequency: 1-4    Assessment&Plan:  - Titrate pitocin per protocol, currently at 16  - Epidural infusing with good pain control  - CEFM; Cat 1 currently  - Cephalic on BSUS    Marialuisa Cochran MD PGY1

## 2024-05-31 NOTE — L&D DELIVERY NOTE
OB Delivery Note  2024  Meaghan Gaona  33 y.o.   Vaginal, Vacuum (Extractor)      Patient underwent IOL from [id number] for FGR after successful ECV. Pt had prolonged 2nd stage of labor, pushed for 1.5hrs with minimal descent - was able to push to +2 but retracted to +1 between pushes. Decision was made to proceed with vacuum assisted vaginal delivery and to perform this in the OR in case of vacuum failure. Risks and benefits of VAVD were discussed with patient including fetal scalp hematoma and increased risk of higher-degree lacerations. Patient was agreeable. She was moved to the OR. She pushed for 2-3 more contractions and was able to hold the head at +2. Kiwi vacuum was applied and pressure of 500mmHg was obtained. The  was delivered after 2 pulls with no pop offs. , no lacerations. Apgars 8/9    Gestational Age: 37w1d  /Para:   Quantitative Blood Loss: Admission to Discharge: 150 mL (2024  1:44 PM - 2024  1:22 PM)    Chaz Gaona [[id number]]      Labor Events    Sac identifier: Sac 1  Rupture date/time: 2024  Rupture type: Artificial  Fluid color: Clear  Fluid odor: None  Labor type: Induced Onset of Labor  Labor allowed to proceed with plans for an attempted vaginal birth?: Yes  Induction: Oxytocin  Induction date/time: 2024  Induction indications: Other  Complications: None       Labor Event Times    Labor onset date/time: 2024  Dilation complete date/time: 2024 1018  Start pushing date/time: 2024 1020       Placenta    Placenta delivery date/time: 2024 0128  Placenta removal: Spontaneous  Placenta appearance: Intact  Placenta disposition: pathology       Cord    Vessels: 3 vessels  Complications: None  Delayed cord clamping?: Yes  Cord clamped date/time: 2024 1224  Cord blood disposition: Lab  Gases sent?: No  Stem cell collection (by provider): No       Lacerations    Episiotomy: None  Perineal  laceration: None  Other lacerations?: No       Anesthesia    Method: Combined spinal-epidural       Operative Delivery    Forceps attempted?: No  Vacuum extractor attempted?: Yes  Vacuum indications: Prolonged Labor  Vacuum type: Kiwi  Vacuum application location: Low  First attempt time vacuum applied: 2024 12:20:00  First attempt time vacuum removed: 2024 12:23:00  Number of pop offs: 0  Number of pulls with vacuum: 2  Failed vacuum delivery?: No       Shoulder Dystocia    Shoulder dystocia present?: No        Delivery    Time head delivered: 2024 12:23:00  Birth date/time: 2024 12:23:00  Delivery type: Vaginal, Vacuum (Extractor)  Complications: None       Apgars    Living status: Living  Apgar Component Scores:  1 min.:  5 min.:  10 min.:  15 min.:  20 min.:    Skin color:  0  1       Heart rate:  2  2       Reflex irritability:  2  2       Muscle tone:  2  2       Respiratory effort:  2  2       Total:  8  9       Apgars assigned by: ADELIA SNYDER RN       Delivery Providers    Delivering clinician: Ford Wilson MD   Provider Role    No Lopes RN Delivery Nurse    Pattie Snyder RN Nursery Nurse    Bina Toledo MD Resident                 Bina Toledo MD

## 2024-05-31 NOTE — PROGRESS NOTES
Intrapartum Progress Note    Assessment/Plan   Meaghan Gaona is a 33 y.o.  at 37w1d by LMP cw 11w6d US presenting IOL in s/o elevated BP and FGR.     IOL   - Now in active labor   - S/p AROM @ 2100, pit on since   - On pitocin currently at 22, continue to titrate per protocol   - IUPC placed for Cat II tracing w/ recurrent variables, began amnioinfusion with improvement of tracing -> Cat I   - Continue to monitor for cervical change q2 hours or PRN while in active labor     Breech presentation, resolved    - S/p ECV -> cephalic     Elevated BP   - Mild range BP at home and in office yesterday, currently normotensive. Does not yet meet criteria for dx  - Hx sPEC in prior pregnancy   - HELLP labs negative, P:C 0.17     Depression   - Continue Zoloft 50mg daily     Maternal Well-being   - Epidural infusing w/ good pain control   - O neg, s/p Rhogam on     Fetal Well-being   - FGR: last growth  EFW 2259g (4%), AC 2%, w/ normal UA dopplers   - CEFM: Cat I tracing   - GBS pos, on PCN ppx    Patient seen and discussed with Dr. Toledo and Dr. Steve Ash (MS4)     I saw and evaluated the patient and discussed with the MS4, edits made in text above. Patient seen and discussed with Dr. Wilson    Subjective   Pt resting comfortably in bed w/ epidural infusing. Reporting increased pressure.     Objective   Last Vitals:  Temp Pulse Resp BP MAP Pulse Ox   36.3 °C (97.3 °F) 83 18 118/70   100 %     Vitals Min/Max Last 24 Hours:  Temp  Min: 36.1 °C (97 °F)  Max: 36.9 °C (98.4 °F)  Pulse  Min: 63  Max: 99  Resp  Min: 16  Max: 18  BP  Min: 105/59  Max: 141/89    Intake/Output:    Intake/Output Summary (Last 24 hours) at 2024 0805  Last data filed at 2024 0636  Gross per 24 hour   Intake 1000 ml   Output 1070 ml   Net -70 ml       Physical Examination:  Cervical Exam  Dilation: 6  Effacement (%): 90  Fetal Station: -2    Fetal Assessment  Baseline Fetal Heart Rate (bpm): 130 bpm  Baseline  "Classification: Normal  Variability: Moderate (Between 6 and 25 BPM)  Pattern: Accelerations  FHR Category: Category I    Fetal Decelerations: No    Contraction Frequency: 1-2.5    Lab Review:  Lab Results   Component Value Date    ABO O 05/30/2024    LABRH NEG 05/30/2024    ABSCRN POS 05/30/2024     Lab Results   Component Value Date    WBC 9.0 05/30/2024    HGB 11.7 (L) 05/30/2024    HCT 35.1 (L) 05/30/2024     05/30/2024     No results found for: \"GRPBSTREP\"  Lab Results   Component Value Date    GLUCOSE 48 (LL) 05/30/2024     (L) 05/30/2024    K 3.8 05/30/2024     05/30/2024    CO2 22 05/30/2024    ANIONGAP 15 05/30/2024    BUN 9 05/30/2024    CREATININE 0.66 05/30/2024    EGFR >90 05/30/2024    CALCIUM 8.4 (L) 05/30/2024    ALBUMIN 3.4 05/30/2024    PROT 6.5 05/30/2024    ALKPHOS 206 (H) 05/30/2024    ALT 11 05/30/2024    AST 17 05/30/2024    BILITOT 0.4 05/30/2024     "

## 2024-06-01 PROCEDURE — 2500000004 HC RX 250 GENERAL PHARMACY W/ HCPCS (ALT 636 FOR OP/ED): Performed by: STUDENT IN AN ORGANIZED HEALTH CARE EDUCATION/TRAINING PROGRAM

## 2024-06-01 PROCEDURE — 2500000002 HC RX 250 W HCPCS SELF ADMINISTERED DRUGS (ALT 637 FOR MEDICARE OP, ALT 636 FOR OP/ED): Performed by: STUDENT IN AN ORGANIZED HEALTH CARE EDUCATION/TRAINING PROGRAM

## 2024-06-01 PROCEDURE — 2500000001 HC RX 250 WO HCPCS SELF ADMINISTERED DRUGS (ALT 637 FOR MEDICARE OP): Performed by: STUDENT IN AN ORGANIZED HEALTH CARE EDUCATION/TRAINING PROGRAM

## 2024-06-01 PROCEDURE — 1210000001 HC SEMI-PRIVATE ROOM DAILY

## 2024-06-01 RX ADMIN — IBUPROFEN 600 MG: 600 TABLET, FILM COATED ORAL at 21:06

## 2024-06-01 RX ADMIN — IBUPROFEN 600 MG: 600 TABLET, FILM COATED ORAL at 08:31

## 2024-06-01 RX ADMIN — ACETAMINOPHEN 975 MG: 325 TABLET ORAL at 21:06

## 2024-06-01 RX ADMIN — ACETAMINOPHEN 975 MG: 325 TABLET ORAL at 03:02

## 2024-06-01 RX ADMIN — SERTRALINE HYDROCHLORIDE 50 MG: 50 TABLET ORAL at 09:00

## 2024-06-01 RX ADMIN — ACETAMINOPHEN 975 MG: 325 TABLET ORAL at 08:31

## 2024-06-01 RX ADMIN — IBUPROFEN 600 MG: 600 TABLET, FILM COATED ORAL at 03:01

## 2024-06-01 RX ADMIN — ENOXAPARIN SODIUM 40 MG: 100 INJECTION SUBCUTANEOUS at 03:03

## 2024-06-01 ASSESSMENT — PAIN SCALES - GENERAL
PAINLEVEL_OUTOF10: 2
PAINLEVEL_OUTOF10: 0 - NO PAIN
PAINLEVEL_OUTOF10: 3
PAINLEVEL_OUTOF10: 0 - NO PAIN
PAINLEVEL_OUTOF10: 0 - NO PAIN

## 2024-06-01 ASSESSMENT — PAIN - FUNCTIONAL ASSESSMENT
PAIN_FUNCTIONAL_ASSESSMENT: 0-10

## 2024-06-01 ASSESSMENT — PAIN DESCRIPTION - ORIENTATION: ORIENTATION: LOWER

## 2024-06-01 ASSESSMENT — PAIN DESCRIPTION - LOCATION
LOCATION: ABDOMEN
LOCATION: ABDOMEN

## 2024-06-01 NOTE — LACTATION NOTE
Lactation Consultant Note  Lactation Consultation  Reason for Consult: Initial assessment  Consultant Name: Elvia Romo RN IBCLC    Maternal Information  Has mother  before?: Yes  How long did the mother previously breastfeed?: Exclusively pumped for her now 2.5 year old, who was in the NICU. Plans to exclusively pump and give some pumped milk and some formula to this baby.  Previous Maternal Breastfeeding Challenges: Exclusive pump and bottle fed  Infant to breast within first 2 hours of birth?: Yes  Exclusive Pump and Bottle Feed: Yes    Maternal Assessment  Breast Assessment: Medium, Symmetrical, Soft  Nipple Assessment: Intact, Erect  [place] Assessment: Normal    Feeding Assessment  Nutrition Source: Breastmilk, Formula (per mother’s request)  Feeding Method: Paced bottle    Breast Pump  Pump: Hospital grade electric pump  Frequency: 8-10 times per day  Duration: 15-20 minutes per session  Breast Shield Size and Type: 21 mm  Units of Volume: Drops    Patient Follow-up  Inpatient Lactation Follow-up Needed : Yes  Outpatient Lactation Follow-up: Recommended    Other OB Lactation Documentation  Maternal Risk Factors: Hypertension  Infant Risk Factors: Early term birth 37-39 weeks    Recommendations/Summary  Mom states that she exclusively pumped and bottle fed her now 2.5 year old child and she would like to do the same for this baby. While in the hospital, she has been latching and also pumping. At home, she will switch to exclusive pumping. Mom's nipples measure 14 mm in diameter bilaterally, so I instructed her as to how to order 16 mm flanges for her Spectra pump at home. In the hospital, she will need to use 21 mm flanges because this is the smallest size that we carry. If pumping is not comfortable or effective with these flanges, mom can continue to latch baby and to hand express. Mom is supplementing with formula and will continue to do so at home. Reinforced the importance of continuing to latch,  hand express, or pump every 2-3 hours for 15-20 mins at a time to optimize milk production. Discussed benefits of skin to skin contact for mom and baby and for mom's milk production and supply. Discussed differences between colostrum and mature milk and that full milk supply typically takes between 3-5 days after delivery to come in. Mom has a pump for at home. We reviewed the outpatient lactation information.

## 2024-06-01 NOTE — PROGRESS NOTES
Postpartum Progress Note      Assessment/Plan       Meaghan Gaona is a 33 y.o., , who initially presented for IOL in s/o presumed gHTN in s/o mild range BPs and sFGR. She had a Vaginal, Vacuum (Extractor)  delivery on 2024  at 37w1d and is now PPD1.        - continue routine care  - pain well controlled on ERAS protocol  - DVT Score: 5 SCDs and enoxaparin prophylactic dose daily while inpatient  - Pt's blood type is O NEG. The baby's blood type is O NEG . Rhogam is not indicated.    gHTN  - dx by mild range BP readings >4 hrs apart   - no medications  - asymptomatic  - HELLP labs neg; P:C 0.17  - normotensive x24 hrs  - discussed 3 day stay for BP, pt verbalizes understanding   - BP cuff for home at discharge, to monitor BID    Depression  - Zoloft 50mg daily  - denies emotional concerns or need for dosage adjustment at this time    Contraception  - POPs    Maternal Well-Being  - emotional support provided  - bonding with infant  - [place] feeding: breastfeeding/pumping encouraged; lactation consult prn     Dispo  - anticipate d/c on PPD#3 if meeting all post-op and postpartum milestones    - The signs and symptoms of PEC were reviewed with the patient, including unrelenting headache, vision changes/blurred vision, and RUQ pain  - BP cuff for home for checking BP twice a day  - Pt instructed to call primary OB if SBP > 160 or DBP > 110 or if development of PEC symptoms   - On discharge, follow up with primary OB in:       - 2-5 days for BP check       - 4-6 weeks for post-partum visit       Principal Problem:    Elevated blood pressure affecting pregnancy in third trimester, antepartum (HHS-HCC)  Active Problems:    Anxiety    Fetal malpresentation (HHS-HCC)    HTN (hypertension)        Subjective   Her pain is well controlled with current medications  She is passing flatus  She is ambulating independently  She is tolerating a Adult diet Regular  She is voiding spontaneously  She reports no breast  or nursing problems  She denies emotional concerns today       Objective   Last Vitals:  Temp Pulse Resp BP MAP Pulse Ox   36.5 °C (97.7 °F) 73 17 121/77   97 %       Physical Exam:  General: well appearing, well nourished, postpartum  Obstetric: fundus firm below umbilicus, lochia light  Skin: Warm, dry; no rashes/lesions/erythema  Neuro: A/Ox3, no gross motor deficit   GI: no distension, appropriately tender, soft  Respiratory: Even and unlabored on RA  Cardiovascular: Trace BLE edema; No erythema, warmth  Psych: appropriate mood and affect      Lab Data:  Lab Results   Component Value Date    WBC 9.0 05/30/2024    HGB 11.7 (L) 05/30/2024    HCT 35.1 (L) 05/30/2024     05/30/2024

## 2024-06-01 NOTE — ANESTHESIA POSTPROCEDURE EVALUATION
Meaghan Gaona is a 33 y.o., , who had a Vaginal, Vacuum (Extractor)  delivery on 2024  at 37w1d and is now POD1.    She had Neuraxial Anesthesia without immediate complications noted.       Pain was appropriately tolerated by patient.     Vitals:    24 0744   BP: 117/78   Pulse: 65   Resp: 16   Temp: 36.5 °C (97.7 °F)   SpO2: 98%       Neuraxial site assessed. No visible redness or swelling. Pain acceptably controlled. Patient able to ambulate and move all extremities without difficulty. Able to void. No complaints of nausea/vomiting. Tolerating PO intake well. No s/sx of PDPH.     Neuraxial site without redness, drainage, swelling.  Neuromuscular block resolved.    Anesthesia will sign off     Marcello Germain DO  Anesthesiology PGY-2, CA-1

## 2024-06-02 LAB
BLOOD EXPIRATION DATE: NORMAL
DISPENSE STATUS: NORMAL
PRODUCT BLOOD TYPE: 9500
PRODUCT CODE: NORMAL
UNIT ABO: NORMAL
UNIT NUMBER: NORMAL
UNIT RH: NORMAL
UNIT VOLUME: 350
XM INTEP: NORMAL

## 2024-06-02 PROCEDURE — 2500000002 HC RX 250 W HCPCS SELF ADMINISTERED DRUGS (ALT 637 FOR MEDICARE OP, ALT 636 FOR OP/ED): Performed by: STUDENT IN AN ORGANIZED HEALTH CARE EDUCATION/TRAINING PROGRAM

## 2024-06-02 PROCEDURE — 1210000001 HC SEMI-PRIVATE ROOM DAILY

## 2024-06-02 PROCEDURE — 2500000004 HC RX 250 GENERAL PHARMACY W/ HCPCS (ALT 636 FOR OP/ED): Performed by: STUDENT IN AN ORGANIZED HEALTH CARE EDUCATION/TRAINING PROGRAM

## 2024-06-02 PROCEDURE — 99232 SBSQ HOSP IP/OBS MODERATE 35: CPT | Performed by: FAMILY MEDICINE

## 2024-06-02 PROCEDURE — 2500000002 HC RX 250 W HCPCS SELF ADMINISTERED DRUGS (ALT 637 FOR MEDICARE OP, ALT 636 FOR OP/ED): Performed by: FAMILY MEDICINE

## 2024-06-02 RX ORDER — IBUPROFEN 600 MG/1
600 TABLET ORAL EVERY 6 HOURS
Qty: 120 TABLET | Refills: 0 | OUTPATIENT
Start: 2024-06-02 | End: 2024-07-02

## 2024-06-02 RX ORDER — ACETAMINOPHEN 325 MG/1
975 TABLET ORAL EVERY 6 HOURS
Qty: 360 TABLET | Refills: 0 | OUTPATIENT
Start: 2024-06-02 | End: 2024-07-02

## 2024-06-02 RX ORDER — NIFEDIPINE 30 MG/1
30 TABLET, FILM COATED, EXTENDED RELEASE ORAL
Status: DISCONTINUED | OUTPATIENT
Start: 2024-06-02 | End: 2024-06-03 | Stop reason: HOSPADM

## 2024-06-02 RX ADMIN — SERTRALINE HYDROCHLORIDE 50 MG: 50 TABLET ORAL at 08:39

## 2024-06-02 RX ADMIN — NIFEDIPINE 30 MG: 30 TABLET, FILM COATED, EXTENDED RELEASE ORAL at 13:47

## 2024-06-02 RX ADMIN — ENOXAPARIN SODIUM 40 MG: 100 INJECTION SUBCUTANEOUS at 04:00

## 2024-06-02 ASSESSMENT — PAIN SCALES - GENERAL
PAINLEVEL_OUTOF10: 0 - NO PAIN

## 2024-06-02 NOTE — PROGRESS NOTES
Postpartum Progress Note      Assessment/Plan       Meaghan Gaona is a 33 y.o., , who initially presented for IOL in s/o [facility]. She had a Vaginal, Vacuum (Extractor)  delivery on 2024  at 37w1d and is now PPD2.      gHTN  - dx by mild range BP readings >4 hrs apart   - low to high milds during post partum course -> start Nifed 30   - asymptomatic  - HELLP labs neg; P:C 0.17  - normotensive x24 hrs  - discussed 3 day stay for BP, pt verbalizes understanding   - BP cuff for home at discharge, to monitor BID      - continue routine care  - pain well controlled on ERAS protocol  - DVT Score: 5 SCDs and enoxaparin prophylactic dose daily while inpatient  - Pt's blood type is O NEG. The baby's blood type is O NEG . Rhogam is not indicated.    Depression  - Zoloft 50mg daily  - denies emotional concerns or need for dosage adjustment at this time     Contraception  - POPs     Maternal Well-Being  - emotional support provided  - bonding with infant  - [place] feeding: breastfeeding/pumping encouraged; lactation consult prn      Dispo  - anticipate d/c on PPD#3 if meeting all post-op and postpartum milestones     - The signs and symptoms of PEC were reviewed with the patient, including unrelenting headache, vision changes/blurred vision, and RUQ pain  - BP cuff for home for checking BP twice a day  - Pt instructed to call primary OB if SBP > 160 or DBP > 110 or if development of PEC symptoms   - On discharge, follow up with primary OB in:       - 2-5 days for BP check       - 4-6 weeks for post-partum visit       Principal Problem:    Elevated blood pressure affecting pregnancy in third trimester, antepartum (HHS-HCC)  Active Problems:    Anxiety    Fetal malpresentation (HHS-HCC)    HTN (hypertension)        Subjective   Her pain is well controlled with current medications  She is passing flatus  She is ambulating independently  She is tolerating a Adult diet Regular  She is voiding spontaneously  She  reports no breast or nursing problems  She denies emotional concerns today         Objective   Last Vitals:  Temp Pulse Resp BP MAP Pulse Ox   36.5 °C (97.7 °F) 73 18 (!) 150/86   96 %       Physical Exam:  General: well appearing, well nourished, postpartum  Obstetric: fundus firm below umbilicus, lochia light  Skin: Warm, dry; no rashes/lesions/erythema  Neuro: A/Ox3, no gross motor deficit   GI: no distension, appropriately tender, soft  Respiratory: Even and unlabored on RA  Cardiovascular: Trace BLE edema; No erythema, warmth  Psych: appropriate mood and affect      Lab Data:  Lab Results   Component Value Date    WBC 9.0 05/30/2024    HGB 11.7 (L) 05/30/2024    HCT 35.1 (L) 05/30/2024     05/30/2024

## 2024-06-03 ENCOUNTER — PHARMACY VISIT (OUTPATIENT)
Dept: PHARMACY | Facility: CLINIC | Age: 34
End: 2024-06-03
Payer: COMMERCIAL

## 2024-06-03 ENCOUNTER — TELEPHONE (OUTPATIENT)
Dept: MATERNAL FETAL MEDICINE | Facility: HOSPITAL | Age: 34
End: 2024-06-03
Payer: COMMERCIAL

## 2024-06-03 VITALS
HEIGHT: 62 IN | TEMPERATURE: 97.7 F | OXYGEN SATURATION: 96 % | HEART RATE: 82 BPM | WEIGHT: 177.69 LBS | DIASTOLIC BLOOD PRESSURE: 78 MMHG | BODY MASS INDEX: 32.7 KG/M2 | SYSTOLIC BLOOD PRESSURE: 118 MMHG | RESPIRATION RATE: 20 BRPM

## 2024-06-03 PROCEDURE — 2500000002 HC RX 250 W HCPCS SELF ADMINISTERED DRUGS (ALT 637 FOR MEDICARE OP, ALT 636 FOR OP/ED): Performed by: FAMILY MEDICINE

## 2024-06-03 PROCEDURE — 2500000004 HC RX 250 GENERAL PHARMACY W/ HCPCS (ALT 636 FOR OP/ED): Performed by: STUDENT IN AN ORGANIZED HEALTH CARE EDUCATION/TRAINING PROGRAM

## 2024-06-03 PROCEDURE — RXMED WILLOW AMBULATORY MEDICATION CHARGE

## 2024-06-03 PROCEDURE — 2500000001 HC RX 250 WO HCPCS SELF ADMINISTERED DRUGS (ALT 637 FOR MEDICARE OP): Performed by: STUDENT IN AN ORGANIZED HEALTH CARE EDUCATION/TRAINING PROGRAM

## 2024-06-03 PROCEDURE — 2500000002 HC RX 250 W HCPCS SELF ADMINISTERED DRUGS (ALT 637 FOR MEDICARE OP, ALT 636 FOR OP/ED): Performed by: STUDENT IN AN ORGANIZED HEALTH CARE EDUCATION/TRAINING PROGRAM

## 2024-06-03 PROCEDURE — 99231 SBSQ HOSP IP/OBS SF/LOW 25: CPT | Performed by: NURSE PRACTITIONER

## 2024-06-03 RX ORDER — NIFEDIPINE 30 MG/1
30 TABLET, FILM COATED, EXTENDED RELEASE ORAL
Qty: 30 TABLET | Refills: 1 | Status: SHIPPED | OUTPATIENT
Start: 2024-06-03 | End: 2024-08-02

## 2024-06-03 RX ORDER — NORETHINDRONE 0.35 MG/1
1 TABLET ORAL DAILY
Qty: 28 TABLET | Refills: 1 | Status: SHIPPED | OUTPATIENT
Start: 2024-06-03 | End: 2024-07-29

## 2024-06-03 RX ADMIN — ENOXAPARIN SODIUM 40 MG: 100 INJECTION SUBCUTANEOUS at 01:25

## 2024-06-03 RX ADMIN — IBUPROFEN 600 MG: 600 TABLET, FILM COATED ORAL at 08:06

## 2024-06-03 RX ADMIN — POLYETHYLENE GLYCOL 3350 17 G: 17 POWDER, FOR SOLUTION ORAL at 08:14

## 2024-06-03 RX ADMIN — SERTRALINE HYDROCHLORIDE 50 MG: 50 TABLET ORAL at 08:05

## 2024-06-03 RX ADMIN — NIFEDIPINE 30 MG: 30 TABLET, FILM COATED, EXTENDED RELEASE ORAL at 08:05

## 2024-06-03 RX ADMIN — ACETAMINOPHEN 975 MG: 325 TABLET ORAL at 08:05

## 2024-06-03 ASSESSMENT — PAIN DESCRIPTION - ORIENTATION: ORIENTATION: UPPER

## 2024-06-03 ASSESSMENT — PAIN SCALES - GENERAL: PAINLEVEL_OUTOF10: 1

## 2024-06-03 ASSESSMENT — PAIN DESCRIPTION - LOCATION: LOCATION: BACK

## 2024-06-03 NOTE — DISCHARGE SUMMARY
Discharge Summary    Admission Date: 2024  Discharge Date: 24  Discharge Diagnosis: Elevated blood pressure affecting pregnancy in third trimester, antepartum ([facility])     Patient Active Problem List   Diagnosis    Anxiety    Depression during pregnancy in first trimester ([facility])    Supervision of high risk pregnancy, antepartum ([facility])    H/O pre-eclampsia in prior pregnancy, currently pregnant ([facility])    Fetal growth restriction antepartum ([facility])    Fetal malpresentation ([facility])    Elevated BP without diagnosis of hypertension    Elevated blood pressure affecting pregnancy in third trimester, antepartum ([facility])    HTN (hypertension)       Hospital Course  Meaghan Gaona is a 33 y.o.,     Initially presented for: elevated BP    Admission Date: 2024    Delivery Date: 2024  12:23 PM     Delivery type: Vaginal, Vacuum (Extractor)      GA at delivery: 37w1d    Outcome: Living     Anesthesia during delivery: Combined spinal-epidural     Intrapartum complications: None     Feeding method: Breastfeeding Status: No    Contraception: POPs We discussed the need to take the pill at exactly the same time everyday and if >2hrs late a backup method must be used for 1 week. Pt verbalized understanding.     Rhogam: The patient's blood type is O NEG. The baby's blood type is O NEG . Rhogam is not indicated.     Now postpartum day: 3.    Hospital course n/f:    gHTN  - dx by mild range BP readings >4 hrs apart   - BP normotensive on Nifed 30  (started )  - asymptomatic  - HELLP labs neg; P:C 0.17  - BP cuff for home at discharge, to monitor BID      Depression  - Zoloft 50mg daily  - denies emotional concerns or need for dosage adjustment at this time    PP course otherwise uneventful.  Meeting all postpartum milestones- ambulating independently, passing flatus, tolerating PO intake, lochia light, voiding spontaneously, and pain well controlled with PO meds.     Dispo  OK for DC today  -  Anticipate DC PPD#3 pending BP control.  - The signs and symptoms of PEC were reviewed with the patient, including unrelenting headache, vision changes/blurred vision, and RUQ pain  - BP cuff for home for checking BP twice a day  - Pt instructed to call primary OB if SBP > 160 or DBP > 110 or if development of PEC symptoms   - On discharge, follow up with primary OB in:       - 2-5 days for BP check       - 4-6 week for post-partum visit     Pertinent Physical Exam At Time of Discharge  General: well appearing, well nourished, postpartum  Obstetric: fundus firm below umbilicus, lochia light  Skin: Warm, dry; no rashes/lesions/erythema  Breast: No masses, nipple discharge  Neuro: A/Ox3, conversational, no gross motor deficit   GI: no distension, appropriately tender, soft, +BS  Respiratory: Even and unlabored on RA, LSCTA BL  Cardiovascular: No BLE edema; No erythema, warmth  Psych: appropriate mood and affect       Your medication list        START taking these medications        Instructions Last Dose Given Next Dose Due   NIFEdipine ER 30 mg 24 hr tablet  Commonly known as: Adalat CC      Take 1 tablet (30 mg) by mouth once daily in the morning. Take before meals. Do not crush, chew, or split.       norethindrone 0.35 mg tablet  Commonly known as: Micronor      Take 1 tablet (0.35 mg) over 28 days by mouth once daily.              CONTINUE taking these medications        Instructions Last Dose Given Next Dose Due   PRENATAL ORAL           sertraline 50 mg tablet  Commonly known as: Zoloft      Take 1 tablet (50 mg) by mouth once daily. as directed              STOP taking these medications      aspirin 81 mg chewable tablet                  Where to Get Your Medications        These [facility]  [address] [facility] [address]      Hours: 8:30 AM to 5 PM Mon-Fri Phone: [phone]   NIFEdipine ER 30 mg 24 hr tablet  norethindrone 0.35 mg tablet           Outpatient  Follow-Up  Future Appointments   Date Time Provider Department Center   6/6/2024 11:00 AM [facility] 1200 OBGYN NURSE RESOURCE [id number]       MAMADOU Barraza-CNP

## 2024-06-03 NOTE — TELEPHONE ENCOUNTER
RN called patient to schedule bp check appt  Patient verified by name and   Patient scheduled for  @ 11am  Patient agreeablr to date and time of the appointment.  CL Hay-RN        ----- Message from BRICE Barraza sent at 6/3/2024  9:05 AM EDT -----  Regarding: Schedule patient  Please schedule patient for BP check before the end of the week. King Thomson.

## 2024-06-05 ENCOUNTER — APPOINTMENT (OUTPATIENT)
Dept: RADIOLOGY | Facility: HOSPITAL | Age: 34
End: 2024-06-05
Payer: COMMERCIAL

## 2024-06-06 ENCOUNTER — CLINICAL SUPPORT (OUTPATIENT)
Dept: OBSTETRICS AND GYNECOLOGY | Facility: HOSPITAL | Age: 34
End: 2024-06-06
Payer: COMMERCIAL

## 2024-06-06 VITALS — HEART RATE: 78 BPM | SYSTOLIC BLOOD PRESSURE: 114 MMHG | DIASTOLIC BLOOD PRESSURE: 78 MMHG

## 2024-06-06 DIAGNOSIS — Z87.59 HISTORY OF POSTPARTUM GESTATIONAL HYPERTENSION: ICD-10-CM

## 2024-06-06 DIAGNOSIS — Z86.79 HISTORY OF POSTPARTUM GESTATIONAL HYPERTENSION: ICD-10-CM

## 2024-06-06 LAB
LABORATORY COMMENT REPORT: NORMAL
PATH REPORT.FINAL DX SPEC: NORMAL
PATH REPORT.GROSS SPEC: NORMAL
PATH REPORT.RELEVANT HX SPEC: NORMAL
PATH REPORT.TOTAL CANCER: NORMAL

## 2024-06-06 PROCEDURE — 99211 OFF/OP EST MAY X REQ PHY/QHP: CPT

## 2024-06-06 NOTE — PROGRESS NOTES
Patient here for PP BP check  Patient delivered on 5/31/2024  BP's at home: WNL  Patient is taking NIFEdipine 30 mg   Denies headache, lightheadedness, dizziness, blurred vision, or pain in the upper right quadrant.   Patient encouraged to continue to take her medications as ordered and check her BP at home   PPV scheduled  Patient verbalized understanding and all questions and concerns were addressed.

## 2024-06-10 ENCOUNTER — PATIENT OUTREACH (OUTPATIENT)
Dept: CARE COORDINATION | Facility: CLINIC | Age: 34
End: 2024-06-10
Payer: COMMERCIAL

## 2024-06-10 SDOH — ECONOMIC STABILITY: GENERAL: WOULD YOU LIKE HELP WITH ANY OF THE FOLLOWING NEEDS?: I DONT NEED HELP WITH ANY OF THESE

## 2024-06-10 NOTE — PROGRESS NOTES
Outreach call to patient to support a smooth transition of care from recent admission.  Spoke with patient, reviewed discharge medications, discharge instructions, assessed social needs, and provided education on importance of follow-up appointment with provider.     Patient declines need for additional services/resources at this time. I reviewed my name/contact information/hours of availability and encouraged patient to follow up should additional questions or non-emergency concerns arise.    SERA Chris   III  [facility]  Office Phone: [phone]  [email or web address]

## 2024-07-02 ENCOUNTER — APPOINTMENT (OUTPATIENT)
Dept: OBSTETRICS AND GYNECOLOGY | Facility: CLINIC | Age: 34
End: 2024-07-02
Payer: COMMERCIAL

## 2024-07-02 VITALS
SYSTOLIC BLOOD PRESSURE: 138 MMHG | DIASTOLIC BLOOD PRESSURE: 82 MMHG | WEIGHT: 159 LBS | BODY MASS INDEX: 29.26 KG/M2 | HEIGHT: 62 IN

## 2024-07-02 DIAGNOSIS — Z30.011 ENCOUNTER FOR INITIAL PRESCRIPTION OF CONTRACEPTIVE PILLS: ICD-10-CM

## 2024-07-02 PROBLEM — O09.299 H/O PRE-ECLAMPSIA IN PRIOR PREGNANCY, CURRENTLY PREGNANT (HHS-HCC): Status: RESOLVED | Noted: 2023-11-12 | Resolved: 2024-07-02

## 2024-07-02 PROBLEM — O09.90 SUPERVISION OF HIGH RISK PREGNANCY, ANTEPARTUM (HHS-HCC): Status: RESOLVED | Noted: 2023-11-12 | Resolved: 2024-07-02

## 2024-07-02 PROBLEM — O32.9XX0: Status: RESOLVED | Noted: 2024-05-22 | Resolved: 2024-07-02

## 2024-07-02 PROBLEM — F32.A DEPRESSION DURING PREGNANCY IN FIRST TRIMESTER (HHS-HCC): Status: RESOLVED | Noted: 2023-11-12 | Resolved: 2024-07-02

## 2024-07-02 PROBLEM — O16.3 ELEVATED BLOOD PRESSURE AFFECTING PREGNANCY IN THIRD TRIMESTER, ANTEPARTUM (HHS-HCC): Status: RESOLVED | Noted: 2024-05-30 | Resolved: 2024-07-02

## 2024-07-02 PROBLEM — O36.5990 FETAL GROWTH RESTRICTION ANTEPARTUM (HHS-HCC): Status: RESOLVED | Noted: 2024-02-06 | Resolved: 2024-07-02

## 2024-07-02 PROBLEM — O99.341 DEPRESSION DURING PREGNANCY IN FIRST TRIMESTER (HHS-HCC): Status: RESOLVED | Noted: 2023-11-12 | Resolved: 2024-07-02

## 2024-07-02 PROBLEM — R03.0 ELEVATED BP WITHOUT DIAGNOSIS OF HYPERTENSION: Status: RESOLVED | Noted: 2024-05-30 | Resolved: 2024-07-02

## 2024-07-02 PROCEDURE — 1036F TOBACCO NON-USER: CPT | Performed by: OBSTETRICS & GYNECOLOGY

## 2024-07-02 PROCEDURE — 3079F DIAST BP 80-89 MM HG: CPT | Performed by: OBSTETRICS & GYNECOLOGY

## 2024-07-02 PROCEDURE — 0503F POSTPARTUM CARE VISIT: CPT | Performed by: OBSTETRICS & GYNECOLOGY

## 2024-07-02 PROCEDURE — 3075F SYST BP GE 130 - 139MM HG: CPT | Performed by: OBSTETRICS & GYNECOLOGY

## 2024-07-02 RX ORDER — NORETHINDRONE 0.35 MG/1
1 TABLET ORAL DAILY
Qty: 28 TABLET | Refills: 12 | Status: SHIPPED | OUTPATIENT
Start: 2024-07-02 | End: 2025-07-02

## 2024-07-02 ASSESSMENT — ENCOUNTER SYMPTOMS
DIARRHEA: 0
COUGH: 0
CHILLS: 0
SHORTNESS OF BREATH: 0
VOMITING: 0
PALPITATIONS: 0
DYSURIA: 0
NAUSEA: 0
CONSTIPATION: 0
HEMATURIA: 0
ABDOMINAL PAIN: 0
FEVER: 0
HEADACHES: 0
DIZZINESS: 0
FREQUENCY: 0
WEAKNESS: 0

## 2024-07-02 ASSESSMENT — PAIN SCALES - GENERAL: PAINLEVEL: 0-NO PAIN

## 2024-07-02 NOTE — PROGRESS NOTES
SUBJECTIVE  Meaghan Gaona is a 33 y.o.  female who presents for a postpartum visit. The delivery was at 37 gestational weeks on   Patient was admitted to L&D for IOL  Outcome: vacuum, outlet  L&D complications: None       I have fully reviewed the prenatal and intrapartum course.      Antepartum problems:  Pregnancy Problems (from 25 to present)       Problem Noted Resolved    Elevated BP without diagnosis of hypertension 2024 by Edna East MD No    Priority:  Medium      Overview Addendum 2024  1:11 PM by Edna East MD     - Mild range today in office and this AM on home cuff         Elevated blood pressure affecting pregnancy in third trimester, antepartum ([facility]) 2024 by MAMADOU Gomez-CNP No    Priority:  Medium      Fetal malpresentation ([facility]) 2024 by Edna East MD No    Priority:  Medium      Fetal growth restriction antepartum ([facility]) 2024 by Edna East MD No    Priority:  Medium      Overview Signed 2024  4:43 PM by Edna East MD     - Continue weekly dopplers/BPP with q3wk growths          Depression during pregnancy in first trimester ([facility]) 2023 by Ana Lau No    Priority:  Medium      Overview Addendum 2024  3:20 PM by Edna East MD     - Well controlled on Zoloft 50 mg  - Declines counseling         Supervision of high risk pregnancy, antepartum ([facility]) 2023 by Ana Lau No    Priority:  Medium      Overview Addendum 2024  4:42 PM by Edna East MD     [x] Initial BMI: 26  [x] Dating US: 8 wk US 23  [x] Prenatal Labs:   [x] Pap:   [x] Genetic Screening:    [x] bASA: 12 weeks  [x] Anatomy US: Normal - FGR  [x] 1hr GCT at 24-28wks: 124  [x] Tdap (27-36wks): 4/4  [x] Flu/COVID: Counseled  [x] Rhogam (if Rh neg): 4/4  [x] GBS at 36 wks:   [x] Breastfeeding: Yes  [x] Postpartum Birth control method: POP  [] 39 weeks discussion of IOL vs. Expectant  management:  [x] Mode of delivery: Anticipate          H/O pre-eclampsia in prior pregnancy, currently pregnant ([facility]) 2023 by Ana Riley    Priority:  Medium      Overview Addendum 2023  3:49 PM by Mikhail Fonseca MD     - Admitted for sPEC and delivered at 31 weeks in prior pregnancy  - MFM consult given history of FGR and sPEC to determine US timing  - Normotensive at new OB, no signs of chronic HTN  [ ] growth US Q 4 weeks starting at 24 weeks                     Birth experience: Overall good  Postpartum course has been unremarkable  Baby's course has been unremarkable  Baby is feeding by breast and formula  Bleeding thin lochia  Bowel function is normal  Bladder function is normal  Patient is not sexually active  Contraception method is oral progesterone-only contraceptive.     OB History          2    Para   2    Term   1       1    AB        Living   2         SAB        IAB        Ectopic        Multiple   0    Live Births   2               # 1 - Date: 23, Sex: None, Weight: 1.474 kg, GA: 31w1d, Delivery: Vaginal, Spontaneous, Apgar1: None, Apgar5: None, Living: Living, Birth Comments: None    # 2 - Date: 24, Sex: Female, Weight: 2.05 kg, GA: 37w1d, Delivery: Vaginal, Vacuum (Extractor), Apgar1: 8, Apgar5: 9, Living: Living, Birth Comments: None     Immunization History   Administered Date(s) Administered    DTP 1992, 1993, 1993    DTaP, Unspecified 1993, 1997    Hepatitis B vaccine, 19 yrs and under (RECOMBIVAX, ENGERIX) 1999, 1999, 2000    HiB PRP-T conjugate vaccine (HIBERIX, ACTHIB) 1992, 1993, 1993, 1993    Influenza, Unspecified 2015, 10/19/2023    Influenza, seasonal, injectable 2015    Influenza, seasonal, injectable, preservative free 10/13/2015    MMR vaccine, subcutaneous (MMR II) 1993, 2005    Meningococcal ACWY-D (Menactra) 4-valent conjugate vaccine  "2007    Moderna SARS-CoV-2 Vaccination 2021, 2021, 11/15/2021    OPV 1992, 1993, 1993, 1997    Rho(D)-IG IM 2024    Tdap vaccine, age 7 year and older (BOOSTRIX, ADACEL) 2007, 2023, 2024     Postpartum Depression: Medium Risk (2023)    [place]  Depression Scale     Last EPDS Total Score: 7     Last EPDS Self Harm Result: Never     Intimate Partner Violence: Not on file     Tobacco Use: Low Risk  (2024)    Patient History     Smoking Tobacco Use: Never     Smokeless Tobacco Use: Never     Passive Exposure: Not on file      Alcohol Use: Not At Risk (2024)    AUDIT-C     Frequency of Alcohol Consumption: Never     Average Number of Drinks: Patient does not drink     Frequency of Binge Drinking: Never      Social History     Substance and Sexual Activity   Drug Use Never           Review of Systems  Review of Systems   Constitutional:  Negative for chills and fever.   Eyes:  Negative for visual disturbance.   Respiratory:  Negative for cough and shortness of breath.    Cardiovascular:  Negative for chest pain and palpitations.   Gastrointestinal:  Negative for abdominal pain, constipation, diarrhea, nausea and vomiting.   Genitourinary:  Positive for vaginal bleeding. Negative for dyspareunia, dysuria, frequency, hematuria, urgency and vaginal discharge.   Neurological:  Negative for dizziness, weakness and headaches.         OBJECTIVE  /82   Ht 1.575 m (5' 2\")   Wt 72.1 kg (159 lb)   LMP 2023   Breastfeeding Yes   BMI 29.08 kg/m²    Body mass index is 29.08 kg/m².     Physical Exam  Constitutional:       Appearance: Normal appearance.   HENT:      Head: Normocephalic and atraumatic.      Nose: Nose normal.      Mouth/Throat:      Mouth: Mucous membranes are moist.   Eyes:      Extraocular Movements: Extraocular movements intact.      Pupils: Pupils are equal, round, and reactive to light.   Cardiovascular:      " Rate and Rhythm: Normal rate.   Pulmonary:      Effort: Pulmonary effort is normal.   Musculoskeletal:         General: Normal range of motion.      Cervical back: Normal range of motion.   Neurological:      General: No focal deficit present.      Mental Status: She is alert and oriented to person, place, and time.   Skin:     General: Skin is warm and dry.   Psychiatric:         Mood and Affect: Mood normal.         Behavior: Behavior normal.   Vitals and nursing note reviewed.           Last Pap: approximate date 12/2022 and was normal     ASSESSMENT & PLAN  Depression during pregnancy in first trimester ([facility])  - Continuing Zoloft    Elevated blood pressure affecting pregnancy in third trimester, antepartum ([facility])  - Self discontinued nifedipine given lower BP and symptomatic this past week  - Discussed ok to stay off of it and monitor BP     -Reviewed safe sleep, ABCs, smoke-free environment advised  -Infant feeding support provided  -Discussed contraception - planning POPs  -Screenings for PPD- 7  -Cervical cancer screening up to date  -Immunizations rubella/varicella immune  -Pertinent pregnancy complications reviewed and discussed, if applicable     Follow up in 3-6 months for annual     Edna East MD  Obstetrics & Gynecology  07/02/24

## 2024-07-02 NOTE — ASSESSMENT & PLAN NOTE
- Self discontinued nifedipine given lower BP and symptomatic this past week  - Discussed ok to stay off of it and monitor BP

## 2025-09-24 ENCOUNTER — APPOINTMENT (OUTPATIENT)
Dept: OBSTETRICS AND GYNECOLOGY | Facility: HOSPITAL | Age: 35
End: 2025-09-24
Payer: COMMERCIAL